# Patient Record
Sex: FEMALE | Race: BLACK OR AFRICAN AMERICAN | NOT HISPANIC OR LATINO | URBAN - METROPOLITAN AREA
[De-identification: names, ages, dates, MRNs, and addresses within clinical notes are randomized per-mention and may not be internally consistent; named-entity substitution may affect disease eponyms.]

---

## 2017-07-14 ENCOUNTER — EMERGENCY (EMERGENCY)
Facility: HOSPITAL | Age: 67
LOS: 1 days | Discharge: HOME | End: 2017-07-14
Attending: EMERGENCY MEDICINE | Admitting: EMERGENCY MEDICINE
Payer: COMMERCIAL

## 2017-07-14 VITALS
TEMPERATURE: 98 F | OXYGEN SATURATION: 97 % | DIASTOLIC BLOOD PRESSURE: 83 MMHG | HEART RATE: 60 BPM | SYSTOLIC BLOOD PRESSURE: 145 MMHG | RESPIRATION RATE: 18 BRPM

## 2017-07-14 VITALS
RESPIRATION RATE: 16 BRPM | SYSTOLIC BLOOD PRESSURE: 152 MMHG | HEART RATE: 71 BPM | OXYGEN SATURATION: 99 % | DIASTOLIC BLOOD PRESSURE: 83 MMHG | WEIGHT: 242.07 LBS | TEMPERATURE: 98 F

## 2017-07-14 DIAGNOSIS — M25.462 EFFUSION, LEFT KNEE: ICD-10-CM

## 2017-07-14 DIAGNOSIS — R53.1 WEAKNESS: ICD-10-CM

## 2017-07-14 DIAGNOSIS — Z88.6 ALLERGY STATUS TO ANALGESIC AGENT: ICD-10-CM

## 2017-07-14 DIAGNOSIS — M25.562 PAIN IN LEFT KNEE: ICD-10-CM
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
## 2017-07-14 DIAGNOSIS — Z79.899 OTHER LONG TERM (CURRENT) DRUG THERAPY: ICD-10-CM
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
## 2017-07-14 DIAGNOSIS — G89.29 OTHER CHRONIC PAIN: ICD-10-CM
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
## 2017-07-14 PROCEDURE — 99284 EMERGENCY DEPT VISIT MOD MDM: CPT | Mod: 25

## 2017-07-14 PROCEDURE — 93005 ELECTROCARDIOGRAM TRACING: CPT

## 2017-07-14 PROCEDURE — 93010 ELECTROCARDIOGRAM REPORT: CPT

## 2017-07-14 PROCEDURE — 99283 EMERGENCY DEPT VISIT LOW MDM: CPT | Mod: 25

## 2017-07-14 PROCEDURE — 73562 X-RAY EXAM OF KNEE 3: CPT | Mod: 26,LT

## 2017-07-14 PROCEDURE — 73562 X-RAY EXAM OF KNEE 3: CPT

## 2017-07-14 PROCEDURE — 96372 THER/PROPH/DIAG INJ SC/IM: CPT

## 2017-07-14 RX ORDER — KETOROLAC TROMETHAMINE 30 MG/ML
30 SYRINGE (ML) INJECTION ONCE
Qty: 0 | Refills: 0 | Status: DISCONTINUED | OUTPATIENT
Start: 2017-07-14 | End: 2017-07-14

## 2017-07-14 RX ORDER — DICLOFENAC SODIUM 75 MG/1
1 TABLET, DELAYED RELEASE ORAL
Qty: 15 | Refills: 0 | OUTPATIENT
Start: 2017-07-14 | End: 2017-07-19

## 2017-07-14 RX ADMIN — Medication 30 MILLIGRAM(S): at 21:20

## 2017-07-14 NOTE — ED ADULT NURSE NOTE - CHIEF COMPLAINT QUOTE
weakness and pain to both legs since 1 month ago ; its worsening now; legs had been giving out; cant walk good; had fallen twice this week; both my ankles are swollen

## 2017-07-14 NOTE — ED PROVIDER NOTE - ATTENDING CONTRIBUTION TO CARE
66 yof c/o knees giving out x weeks.  states fell onto her L knee 1 wk ago.  no back pain, no urinary sx, no paresthesia.  hx of arthritis.  no other trauma, no UE weakness.    agree w/ PA, baseline ambulation w/ walker, unchanged, no obvious joint swelling or deformity, will check xray, low suspicion for cord compression or infectious process or septic joint

## 2017-07-14 NOTE — ED ADULT NURSE NOTE - OBJECTIVE STATEMENT
pt aaox3, presents to ED c/o "losing strength in my legs they giving out." Pt has bilateral lower leg swelling and pain. Pain L>R. Minor Non-pitting swelling pt aaox3, presents to ED c/o "losing strength in my legs they giving out." Pt c/o bilateral lower leg swelling and pain that has worsen this week. Onset 1 month. Pain L>R. Minor Non-pitting swelling. pt states ankle swelling "has been there a while." PMH HTN, HLD, arthritis, gastric ulcer.

## 2017-07-14 NOTE — ED PROVIDER NOTE - MEDICAL DECISION MAKING DETAILS
pt w/chronic knee pain / hx of arthritis, has not gone back to her orthopedist but has had cortisone inj in past w/good relief, fell a wk ago and having L knee pain, no lbp / no signs or concern for cord compression, + symptomatic relief w/toradol, ace wrap for ambulatory comfort (not a candidate for knee immobilizer 2/2 to body habitus), given ortho f/u, pt understands and agrees w/plan

## 2017-07-14 NOTE — ED PROVIDER NOTE - MUSCULOSKELETAL, MLM
no back tend, FROM, ambulatory w/walker (baseline), no pelvis instability, no hip tend b/l, able to straight leg raise b/l, muscle strength 5/5 b/l LE, knees: min swelling over L anterior knee, no patella tend b/l, FROM b/l, no tib / fib tend b/l, pedal pulses 2+ b/l, + light touch b/l

## 2017-07-14 NOTE — ED PROVIDER NOTE - OBJECTIVE STATEMENT
The pt is a 67 y/o F, who presents to ED c/o knees giving out x few wks. Hx of arthritis, fell onto L knee 1 wk ago, takes naprosyn w/relief. Pain is 4/10, aggravated w/walking and moving, hx of arthritis - has gotten cortisone shots in past, went to see a pmd and advised to go to ED instead of setting up outpatient f/u to expedite care. Denies h/a, back pain, loss of bowel or bladder control, abd pain, cp, sob, fevers, chills

## 2017-07-14 NOTE — ED ADULT TRIAGE NOTE - CHIEF COMPLAINT QUOTE
weakness and pain to both legs since 1 month ago ; its worsening now; legs had been giving out; cant walk good; had fallen twice this week weakness and pain to both legs since 1 month ago ; its worsening now; legs had been giving out; cant walk good; had fallen twice this week; both my ankles are swollen

## 2017-07-21 ENCOUNTER — APPOINTMENT (OUTPATIENT)
Dept: ORTHOPEDIC SURGERY | Facility: CLINIC | Age: 67
End: 2017-07-21

## 2017-07-21 DIAGNOSIS — M17.12 UNILATERAL PRIMARY OSTEOARTHRITIS, LEFT KNEE: ICD-10-CM

## 2017-07-26 ENCOUNTER — EMERGENCY (EMERGENCY)
Facility: HOSPITAL | Age: 67
LOS: 1 days | Discharge: HOME | End: 2017-07-26
Attending: EMERGENCY MEDICINE | Admitting: EMERGENCY MEDICINE
Payer: COMMERCIAL

## 2017-07-26 VITALS
RESPIRATION RATE: 18 BRPM | SYSTOLIC BLOOD PRESSURE: 134 MMHG | TEMPERATURE: 98 F | OXYGEN SATURATION: 96 % | HEART RATE: 66 BPM | DIASTOLIC BLOOD PRESSURE: 87 MMHG

## 2017-07-26 VITALS
TEMPERATURE: 99 F | HEIGHT: 66 IN | WEIGHT: 250 LBS | RESPIRATION RATE: 18 BRPM | DIASTOLIC BLOOD PRESSURE: 86 MMHG | SYSTOLIC BLOOD PRESSURE: 146 MMHG | HEART RATE: 67 BPM | OXYGEN SATURATION: 97 %

## 2017-07-26 DIAGNOSIS — Z98.890 OTHER SPECIFIED POSTPROCEDURAL STATES: Chronic | ICD-10-CM

## 2017-07-26 DIAGNOSIS — E78.5 HYPERLIPIDEMIA, UNSPECIFIED: ICD-10-CM

## 2017-07-26 DIAGNOSIS — M17.12 UNILATERAL PRIMARY OSTEOARTHRITIS, LEFT KNEE: ICD-10-CM

## 2017-07-26 DIAGNOSIS — I10 ESSENTIAL (PRIMARY) HYPERTENSION: ICD-10-CM

## 2017-07-26 DIAGNOSIS — Z87.891 PERSONAL HISTORY OF NICOTINE DEPENDENCE: ICD-10-CM

## 2017-07-26 DIAGNOSIS — Z88.6 ALLERGY STATUS TO ANALGESIC AGENT: ICD-10-CM

## 2017-07-26 DIAGNOSIS — Z79.899 OTHER LONG TERM (CURRENT) DRUG THERAPY: ICD-10-CM

## 2017-07-26 DIAGNOSIS — R53.1 WEAKNESS: ICD-10-CM

## 2017-07-26 LAB
ALBUMIN SERPL ELPH-MCNC: 4 G/DL — SIGNIFICANT CHANGE UP (ref 3.3–5)
ALP SERPL-CCNC: 68 U/L — SIGNIFICANT CHANGE UP (ref 40–120)
ALT FLD-CCNC: 22 U/L — SIGNIFICANT CHANGE UP (ref 10–45)
ANION GAP SERPL CALC-SCNC: 13 MMOL/L — SIGNIFICANT CHANGE UP (ref 5–17)
AST SERPL-CCNC: 19 U/L — SIGNIFICANT CHANGE UP (ref 10–40)
BILIRUB SERPL-MCNC: 0.4 MG/DL — SIGNIFICANT CHANGE UP (ref 0.2–1.2)
BUN SERPL-MCNC: 12 MG/DL — SIGNIFICANT CHANGE UP (ref 7–23)
CALCIUM SERPL-MCNC: 9.3 MG/DL — SIGNIFICANT CHANGE UP (ref 8.4–10.5)
CHLORIDE SERPL-SCNC: 104 MMOL/L — SIGNIFICANT CHANGE UP (ref 96–108)
CO2 SERPL-SCNC: 24 MMOL/L — SIGNIFICANT CHANGE UP (ref 22–31)
CREAT SERPL-MCNC: 0.7 MG/DL — SIGNIFICANT CHANGE UP (ref 0.5–1.3)
GLUCOSE SERPL-MCNC: 88 MG/DL — SIGNIFICANT CHANGE UP (ref 70–99)
HCT VFR BLD CALC: 39.6 % — SIGNIFICANT CHANGE UP (ref 34.5–45)
HGB BLD-MCNC: 13 G/DL — SIGNIFICANT CHANGE UP (ref 11.5–15.5)
MCHC RBC-ENTMCNC: 30.6 PG — SIGNIFICANT CHANGE UP (ref 27–34)
MCHC RBC-ENTMCNC: 32.8 G/DL — SIGNIFICANT CHANGE UP (ref 32–36)
MCV RBC AUTO: 93.2 FL — SIGNIFICANT CHANGE UP (ref 80–100)
PLATELET # BLD AUTO: 263 K/UL — SIGNIFICANT CHANGE UP (ref 150–400)
POTASSIUM SERPL-MCNC: 4.1 MMOL/L — SIGNIFICANT CHANGE UP (ref 3.5–5.3)
POTASSIUM SERPL-SCNC: 4.1 MMOL/L — SIGNIFICANT CHANGE UP (ref 3.5–5.3)
PROT SERPL-MCNC: 7.5 G/DL — SIGNIFICANT CHANGE UP (ref 6–8.3)
RBC # BLD: 4.25 M/UL — SIGNIFICANT CHANGE UP (ref 3.8–5.2)
RBC # FLD: 13.7 % — SIGNIFICANT CHANGE UP (ref 10.3–16.9)
SODIUM SERPL-SCNC: 141 MMOL/L — SIGNIFICANT CHANGE UP (ref 135–145)
TSH SERPL-MCNC: 1.02 UIU/ML — SIGNIFICANT CHANGE UP (ref 0.35–4.94)
WBC # BLD: 10.7 K/UL — HIGH (ref 3.8–10.5)
WBC # FLD AUTO: 10.7 K/UL — HIGH (ref 3.8–10.5)

## 2017-07-26 PROCEDURE — 99284 EMERGENCY DEPT VISIT MOD MDM: CPT

## 2017-07-26 RX ORDER — OXYCODONE AND ACETAMINOPHEN 5; 325 MG/1; MG/1
2 TABLET ORAL ONCE
Qty: 0 | Refills: 0 | Status: DISCONTINUED | OUTPATIENT
Start: 2017-07-26 | End: 2017-07-26

## 2017-07-26 NOTE — ED ADULT NURSE NOTE - OBJECTIVE STATEMENT
pt. presented with c/o inability to walk getting worse over 3 weeks, pt. states her "legs give out" and c/o left knee pain and limited ROM. Pt. A&Ox3, communicates w/o difficulty, denies chest pain, shortness of breath, n/v/d, fever, chills, no neuro deficits noted.

## 2017-07-26 NOTE — ED PROVIDER NOTE - CONSTITUTIONAL, MLM
normal... Tearful during physical exam. Well appearing, well nourished, awake, alert, oriented to person, place, time/situation and in no apparent distress.

## 2017-07-26 NOTE — ED ADULT TRIAGE NOTE - CHIEF COMPLAINT QUOTE
Pt previously seen by [facility] ED and referred to ORTHO presents with WEAKNESS, INABILITY TO WEIGHT BEAR X 3 WEEKS

## 2017-07-26 NOTE — ED ADULT NURSE NOTE - CHIEF COMPLAINT QUOTE
Pt previously seen by [facility] ED and referred to ORTHO presents with WEAKNESS, INABILITY TO WEIGHT BEAR X 3 WEEKS

## 2017-07-26 NOTE — ED PROVIDER NOTE - MEDICAL DECISION MAKING DETAILS
67 yo F with Hx of Hypertension, hyperlipidemia, and L knee osteoarthritis who presented with weakness. Hgb, electrolytes and TSH WNL. Patient previously walking with walker, but now using wheelchair 2/2 L knee osteoarthritis. Scheduled for orthopedics appointment on 8/25 for likely L knee arthroplasty. 65 yo F with Hx of Hypertension, hyperlipidemia, and L knee osteoarthritis who presented with weakness 2/2 knee pain. Hgb, electrolytes and TSH WNL. No neurologic weakness, ataxia, numbness. Reflexes intact, no Babinski. Patient previously walking with walker, but obtained a wheelchair on her own which she is now using 2/2 L knee osteoarthritis. Seen by orthopedics last week and diagnosed with severe L knee osteoarthritis likely requiring surgery. Scheduled for f/u orthopedics appointment on 8/25. 65 yo F with Hx of Hypertension, hyperlipidemia, and L knee osteoarthritis who presented with weakness 2/2 knee pain. Hgb, electrolytes and TSH WNL. No neurologic weakness, ataxia, numbness. Reflexes intact, no Babinski. Patient previously walking with walker, but obtained a wheelchair on her own which she is now using 2/2 L knee osteoarthritis. Seen by orthopedics last week and diagnosed with severe L knee osteoarthritis likely requiring surgery. Percocet for pain control. Resume NSAIDs when home. Scheduled for f/u orthopedics appointment on 8/25. Patient was medically optimized, stable and ready for discharge. Plan of care and return precautions were discussed with the patient who verbally stated understanding. Ambulance for transport home.

## 2017-07-26 NOTE — ED PROVIDER NOTE - GASTROINTESTINAL NEGATIVE STATEMENT, MLM
+constipation, no abdominal pain, no bloating, no diarrhea, no nausea and no vomiting. Good appetite.

## 2017-07-26 NOTE — ED PROVIDER NOTE - OBJECTIVE STATEMENT
65 yo F with Hx of Hypertension, hyperlipidemia, and L knee osteoarthritis presents with difficulty standing 2/2 weakness. Patient reports that she has had frequent falls 2/2 generalized weakness. She presented to the ED 11 days ago for L knee pain following fall. At that time xray showed now evidence of acute traumatic knee injury. The knee was wrapped and she was discharged with orthopedic follow up. She was seen by ortho, who diagnosed severe osteoarthritis and recommended knee replacement for which she was referred to Dr Miller with whom she has an appointment on 8/25. She presented today for continued, generalized weakness worse in the LLE. She was previously able to walk with a walker, but states that she has been staying in a wheelchair because of L knee pain. Today she was unable to stand from the wheelchair. She slid to the floor and required assistance to get back into the wheelchair. She also complains of numbness and tingling "all over," but on further questioning does not have decreased sensation. She denies any other systemic symptoms. 65 yo F with Hx of Hypertension, hyperlipidemia, and L knee osteoarthritis presents with difficulty standing 2/2 weakness. Patient reports that she has had frequent falls 2/2 generalized weakness. She presented to the ED 11 days ago for L knee pain following fall. At that time xray showed now evidence of acute traumatic knee injury. The knee was wrapped and she was discharged with orthopedic follow up. She was seen by ortho, who diagnosed severe osteoarthritis and recommended knee replacement for which she was referred to Dr Miller with whom she has an appointment on 8/25. She presented today for continued, generalized weakness worse in the LLE. She was previously able to walk with a walker, but states that she has been staying in a wheelchair, which she recently obtained because of L knee pain. Knee pain has been controlled with diclofenac, which she had refilled by an outpatient provider. Today she was unable to stand from the wheelchair. She slid to the floor and required assistance to get back into the wheelchair. She also complains of numbness and tingling "all over," but on further questioning does not have decreased sensation. She denies any other systemic symptoms.

## 2017-07-26 NOTE — ED PROVIDER NOTE - ATTENDING CONTRIBUTION TO CARE
Agree with resident's documentation -- pt's OA pain improved with NSAIDS, no evidence on exam or history compatible with infection/septic knees or gouty arthritis, will f/u with PMD to arrange home-care as pt feels she would benefit from a home aid while she prepares for knee surgery with her orthopedic surgeon, with whom pt is already connected. given strict return precautions and anticipatory guidance.

## 2017-07-27 LAB — VIT B12 SERPL-MCNC: 287 PG/ML — SIGNIFICANT CHANGE UP (ref 243–894)

## 2017-07-27 PROCEDURE — 82607 VITAMIN B-12: CPT

## 2017-07-27 PROCEDURE — 84443 ASSAY THYROID STIM HORMONE: CPT

## 2017-07-27 PROCEDURE — 36415 COLL VENOUS BLD VENIPUNCTURE: CPT

## 2017-07-27 PROCEDURE — 80053 COMPREHEN METABOLIC PANEL: CPT

## 2017-07-27 PROCEDURE — 99283 EMERGENCY DEPT VISIT LOW MDM: CPT

## 2017-07-27 PROCEDURE — 85027 COMPLETE CBC AUTOMATED: CPT

## 2017-07-27 RX ADMIN — OXYCODONE AND ACETAMINOPHEN 2 TABLET(S): 5; 325 TABLET ORAL at 00:00

## 2017-08-13 PROBLEM — M17.12 PRIMARY OSTEOARTHRITIS OF LEFT KNEE: Status: ACTIVE | Noted: 2017-08-13

## 2017-08-16 ENCOUNTER — INPATIENT (INPATIENT)
Facility: HOSPITAL | Age: 67
LOS: 5 days | Discharge: HOME | DRG: 471 | End: 2017-08-22
Attending: ORTHOPAEDIC SURGERY | Admitting: ORTHOPAEDIC SURGERY
Payer: SELF-PAY

## 2017-08-16 VITALS
OXYGEN SATURATION: 99 % | RESPIRATION RATE: 16 BRPM | TEMPERATURE: 98 F | DIASTOLIC BLOOD PRESSURE: 86 MMHG | HEART RATE: 54 BPM | SYSTOLIC BLOOD PRESSURE: 176 MMHG

## 2017-08-16 DIAGNOSIS — M48.02 SPINAL STENOSIS, CERVICAL REGION: ICD-10-CM
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
## 2017-08-16 DIAGNOSIS — Z29.9 ENCOUNTER FOR PROPHYLACTIC MEASURES, UNSPECIFIED: ICD-10-CM

## 2017-08-16 DIAGNOSIS — Z98.890 OTHER SPECIFIED POSTPROCEDURAL STATES: Chronic | ICD-10-CM

## 2017-08-16 DIAGNOSIS — R29.898 OTHER SYMPTOMS AND SIGNS INVOLVING THE MUSCULOSKELETAL SYSTEM: ICD-10-CM

## 2017-08-16 DIAGNOSIS — R63.8 OTHER SYMPTOMS AND SIGNS CONCERNING FOOD AND FLUID INTAKE: ICD-10-CM

## 2017-08-16 DIAGNOSIS — E78.5 HYPERLIPIDEMIA, UNSPECIFIED: ICD-10-CM

## 2017-08-16 DIAGNOSIS — S14.125A CENTRAL CORD SYNDROME AT C5 LEVEL OF CERVICAL SPINAL CORD, INITIAL ENCOUNTER: ICD-10-CM

## 2017-08-16 DIAGNOSIS — R00.1 BRADYCARDIA, UNSPECIFIED: ICD-10-CM

## 2017-08-16 DIAGNOSIS — I10 ESSENTIAL (PRIMARY) HYPERTENSION: ICD-10-CM

## 2017-08-16 DIAGNOSIS — M50.01 CERVICAL DISC DISORDER WITH MYELOPATHY, HIGH CERVICAL REGION: ICD-10-CM

## 2017-08-16 DIAGNOSIS — M17.10 UNILATERAL PRIMARY OSTEOARTHRITIS, UNSPECIFIED KNEE: ICD-10-CM

## 2017-08-16 LAB
ALBUMIN SERPL ELPH-MCNC: 3.9 G/DL — SIGNIFICANT CHANGE UP (ref 3.3–5)
ALP SERPL-CCNC: 64 U/L — SIGNIFICANT CHANGE UP (ref 40–120)
ALT FLD-CCNC: 14 U/L — SIGNIFICANT CHANGE UP (ref 10–45)
ANION GAP SERPL CALC-SCNC: 12 MMOL/L — SIGNIFICANT CHANGE UP (ref 5–17)
AST SERPL-CCNC: 12 U/L — SIGNIFICANT CHANGE UP (ref 10–40)
BASOPHILS NFR BLD AUTO: 0.6 % — SIGNIFICANT CHANGE UP (ref 0–2)
BILIRUB SERPL-MCNC: 0.4 MG/DL — SIGNIFICANT CHANGE UP (ref 0.2–1.2)
BUN SERPL-MCNC: 9 MG/DL — SIGNIFICANT CHANGE UP (ref 7–23)
CALCIUM SERPL-MCNC: 9.3 MG/DL — SIGNIFICANT CHANGE UP (ref 8.4–10.5)
CHLORIDE SERPL-SCNC: 103 MMOL/L — SIGNIFICANT CHANGE UP (ref 96–108)
CO2 SERPL-SCNC: 27 MMOL/L — SIGNIFICANT CHANGE UP (ref 22–31)
CREAT SERPL-MCNC: 0.8 MG/DL — SIGNIFICANT CHANGE UP (ref 0.5–1.3)
EOSINOPHIL NFR BLD AUTO: 1.4 % — SIGNIFICANT CHANGE UP (ref 0–6)
ERYTHROCYTE [SEDIMENTATION RATE] IN BLOOD: 43 MM/HR — HIGH
GLUCOSE SERPL-MCNC: 119 MG/DL — HIGH (ref 70–99)
HCT VFR BLD CALC: 34.3 % — LOW (ref 34.5–45)
HGB BLD-MCNC: 11.8 G/DL — SIGNIFICANT CHANGE UP (ref 11.5–15.5)
LYMPHOCYTES # BLD AUTO: 29.6 % — SIGNIFICANT CHANGE UP (ref 13–44)
MCHC RBC-ENTMCNC: 31.2 PG — SIGNIFICANT CHANGE UP (ref 27–34)
MCHC RBC-ENTMCNC: 34.4 G/DL — SIGNIFICANT CHANGE UP (ref 32–36)
MCV RBC AUTO: 90.7 FL — SIGNIFICANT CHANGE UP (ref 80–100)
MONOCYTES NFR BLD AUTO: 7.2 % — SIGNIFICANT CHANGE UP (ref 2–14)
NEUTROPHILS NFR BLD AUTO: 61.2 % — SIGNIFICANT CHANGE UP (ref 43–77)
PLATELET # BLD AUTO: 277 K/UL — SIGNIFICANT CHANGE UP (ref 150–400)
POTASSIUM SERPL-MCNC: 4.3 MMOL/L — SIGNIFICANT CHANGE UP (ref 3.5–5.3)
POTASSIUM SERPL-SCNC: 4.3 MMOL/L — SIGNIFICANT CHANGE UP (ref 3.5–5.3)
PROT SERPL-MCNC: 7.6 G/DL — SIGNIFICANT CHANGE UP (ref 6–8.3)
RBC # BLD: 3.78 M/UL — LOW (ref 3.8–5.2)
RBC # BLD: 3.78 M/UL — LOW (ref 3.8–5.2)
RBC # FLD: 13.3 % — SIGNIFICANT CHANGE UP (ref 10.3–16.9)
RETICS/RBC NFR: 1.3 % — SIGNIFICANT CHANGE UP (ref 0.5–2.5)
SODIUM SERPL-SCNC: 142 MMOL/L — SIGNIFICANT CHANGE UP (ref 135–145)
WBC # BLD: 10.5 K/UL — SIGNIFICANT CHANGE UP (ref 3.8–10.5)
WBC # FLD AUTO: 10.5 K/UL — SIGNIFICANT CHANGE UP (ref 3.8–10.5)

## 2017-08-16 PROCEDURE — 71020: CPT | Mod: 26

## 2017-08-16 PROCEDURE — 93010 ELECTROCARDIOGRAM REPORT: CPT

## 2017-08-16 PROCEDURE — 72156 MRI NECK SPINE W/O & W/DYE: CPT | Mod: 26

## 2017-08-16 PROCEDURE — 72100 X-RAY EXAM L-S SPINE 2/3 VWS: CPT | Mod: 26

## 2017-08-16 PROCEDURE — 99223 1ST HOSP IP/OBS HIGH 75: CPT | Mod: GC

## 2017-08-16 PROCEDURE — 72142 MRI NECK SPINE W/DYE: CPT | Mod: 26

## 2017-08-16 PROCEDURE — 99285 EMERGENCY DEPT VISIT HI MDM: CPT | Mod: 25

## 2017-08-16 RX ORDER — DEXAMETHASONE 0.5 MG/5ML
10 ELIXIR ORAL ONCE
Qty: 0 | Refills: 0 | Status: DISCONTINUED | OUTPATIENT
Start: 2017-08-16 | End: 2017-08-16

## 2017-08-16 RX ORDER — INSULIN LISPRO 100/ML
VIAL (ML) SUBCUTANEOUS
Qty: 0 | Refills: 0 | Status: DISCONTINUED | OUTPATIENT
Start: 2017-08-16 | End: 2017-08-22

## 2017-08-16 RX ORDER — ATORVASTATIN CALCIUM 80 MG/1
20 TABLET, FILM COATED ORAL AT BEDTIME
Qty: 0 | Refills: 0 | Status: DISCONTINUED | OUTPATIENT
Start: 2017-08-16 | End: 2017-08-22

## 2017-08-16 RX ORDER — DEXTROSE 50 % IN WATER 50 %
25 SYRINGE (ML) INTRAVENOUS ONCE
Qty: 0 | Refills: 0 | Status: DISCONTINUED | OUTPATIENT
Start: 2017-08-16 | End: 2017-08-22

## 2017-08-16 RX ORDER — ACETAMINOPHEN 500 MG
650 TABLET ORAL EVERY 6 HOURS
Qty: 0 | Refills: 0 | Status: DISCONTINUED | OUTPATIENT
Start: 2017-08-16 | End: 2017-08-17

## 2017-08-16 RX ORDER — AMLODIPINE BESYLATE 2.5 MG/1
5 TABLET ORAL DAILY
Qty: 0 | Refills: 0 | Status: DISCONTINUED | OUTPATIENT
Start: 2017-08-16 | End: 2017-08-16

## 2017-08-16 RX ORDER — GLUCAGON INJECTION, SOLUTION 0.5 MG/.1ML
1 INJECTION, SOLUTION SUBCUTANEOUS ONCE
Qty: 0 | Refills: 0 | Status: DISCONTINUED | OUTPATIENT
Start: 2017-08-16 | End: 2017-08-22

## 2017-08-16 RX ORDER — DEXTROSE 50 % IN WATER 50 %
1 SYRINGE (ML) INTRAVENOUS ONCE
Qty: 0 | Refills: 0 | Status: DISCONTINUED | OUTPATIENT
Start: 2017-08-16 | End: 2017-08-22

## 2017-08-16 RX ORDER — DEXAMETHASONE 0.5 MG/5ML
6 ELIXIR ORAL EVERY 6 HOURS
Qty: 0 | Refills: 0 | Status: DISCONTINUED | OUTPATIENT
Start: 2017-08-17 | End: 2017-08-22

## 2017-08-16 RX ORDER — DEXTROSE 50 % IN WATER 50 %
12.5 SYRINGE (ML) INTRAVENOUS ONCE
Qty: 0 | Refills: 0 | Status: DISCONTINUED | OUTPATIENT
Start: 2017-08-16 | End: 2017-08-22

## 2017-08-16 RX ORDER — AMLODIPINE BESYLATE 2.5 MG/1
10 TABLET ORAL DAILY
Qty: 0 | Refills: 0 | Status: DISCONTINUED | OUTPATIENT
Start: 2017-08-16 | End: 2017-08-22

## 2017-08-16 RX ORDER — HEPARIN SODIUM 5000 [USP'U]/ML
5000 INJECTION INTRAVENOUS; SUBCUTANEOUS EVERY 8 HOURS
Qty: 0 | Refills: 0 | Status: DISCONTINUED | OUTPATIENT
Start: 2017-08-16 | End: 2017-08-17

## 2017-08-16 RX ORDER — SODIUM CHLORIDE 9 MG/ML
1000 INJECTION, SOLUTION INTRAVENOUS
Qty: 0 | Refills: 0 | Status: DISCONTINUED | OUTPATIENT
Start: 2017-08-16 | End: 2017-08-22

## 2017-08-16 RX ORDER — ATORVASTATIN CALCIUM 80 MG/1
0 TABLET, FILM COATED ORAL
Qty: 0 | Refills: 0 | COMMUNITY

## 2017-08-16 RX ORDER — DEXAMETHASONE 0.5 MG/5ML
10 ELIXIR ORAL ONCE
Qty: 0 | Refills: 0 | Status: COMPLETED | OUTPATIENT
Start: 2017-08-16 | End: 2017-08-16

## 2017-08-16 RX ORDER — ATENOLOL 25 MG/1
0 TABLET ORAL
Qty: 0 | Refills: 0 | COMMUNITY

## 2017-08-16 RX ADMIN — HEPARIN SODIUM 5000 UNIT(S): 5000 INJECTION INTRAVENOUS; SUBCUTANEOUS at 23:00

## 2017-08-16 RX ADMIN — Medication 10 MILLIGRAM(S): at 23:05

## 2017-08-16 RX ADMIN — ATORVASTATIN CALCIUM 20 MILLIGRAM(S): 80 TABLET, FILM COATED ORAL at 21:17

## 2017-08-16 NOTE — H&P ADULT - PROBLEM SELECTOR PLAN 3
In ED /86. Home regimen: atenolol 25mg daily, amlodipine 10mg daily, valsartan 320mg daily, furosemide 20mg daily;  patient hasn't filled her medications for the past year  - started on amlodipine 10mg PO daily In ED /86. Medications recorded at pharmacy (haven't been picked up in over a year): atenolol 25mg daily, amlodipine 10mg daily, valsartan 320mg daily, furosemide 20mg daily; only reported to have picked up diclofenac at pharmacy (new script is 25mg)    - started on amlodipine 10mg PO daily

## 2017-08-16 NOTE — H&P ADULT - NSHPSOCIALHISTORY_GEN_ALL_CORE
Pt's daughter lives with patient and helps her with ADLs (bathing, cooking).   Pt used to work for [employer] but hasn't been working for the past month.  Never smoker. Never IVDU. Occasional alcohol intake. Pt's daughter lives with patient and helps her with ADLs (bathing, cooking).   Pt used to work for [employer] but hasn't been working for the past month.  Never smoker. Never IVDU. Occasional alcohol intake.  Uses walker to ambulate, occasionally wheelchair

## 2017-08-16 NOTE — H&P ADULT - ASSESSMENT
66F with PMH of HTN, HLD, gastric ulcer, L knee osteoarthritis was sent to [facility] ED by neurologist for weakness and inability to ambulate. Admitted for PT eval and work-up for cervical spine compression vs. cervical myelitis vs. cervical spondylosis. 66F with PMH of HTN, HLD, gastric ulcer, L knee osteoarthritis was sent to [facility] ED by neurologist for UE weakness and c/o L knee weakness and inability to ambulate. Admitted for PT eval and work-up for cervical spine compression vs. cervical myelitis vs. cervical spondylosis.

## 2017-08-16 NOTE — H&P ADULT - ATTENDING COMMENTS
Pt seen and examined at bedside     Agree with HPI, ROS as above.     VS, Labs, FH, SH, allergies, medications, imaging reviewed. Agree with physical exam as above.    A/P: 66F with PMH of HTN, HLD, gastric ulcer, L knee osteoarthritis was sent to [facility] ED by neurologist for UE weakness and c/o L knee weakness and inability to ambulate. Admitted for PT eval and work-up for cervical spine compression vs. cervical myelitis vs. cervical spondylosis.     **Weakness  -Likely mechanical 2/2 long-standing hx of L knee osteoarthritis; this is the patients 3rd ED visit for the same symptoms of knee pain; has scheduled appointment with orthopedist on 8/29/2017, has previously received steroid injections at [facility], currently asymptomatic  -c/w Tylenol 650mg q6h PRN moderate pain   -PT consult in AM  -Ortho consulted appreciate further recs  -Pt's outpatient neurologist had also noted weakness of upper extremities, exam today showing full strength and no neurologic abnormalities  -Cervical MRI ordered in ED    Plan otherwise as outlined above..... Pt seen and examined at bedside     Agree with HPI, ROS as above.     VS, Labs, FH, SH, allergies, medications, imaging reviewed. Agree with physical exam as above.    A/P: 66F with PMH of HTN, HLD, gastric ulcer, L knee osteoarthritis was sent to [facility] ED by neurologist for UE weakness and c/o L knee weakness and inability to ambulate. Admitted for PT eval and work-up for cervical spine compression vs. cervical myelitis vs. cervical spondylosis.     **Weakness  -Likely mechanical 2/2 long-standing hx of L knee osteoarthritis; this is the patients 3rd ED visit for the same symptoms of knee pain; has scheduled appointment with orthopedist on 8/29/2017, has previously received steroid injections at [facility], currently asymptomatic  -c/w Tylenol 650mg q6h PRN moderate pain   -PT consult in AM  -Ortho consulted appreciate further recs  -Pt's outpatient neurologist had also noted weakness of upper extremities  -Cervical MRI ordered in ED    Plan otherwise as outlined above..... Pt seen and examined at bedside on 8/16 @ 2100    Pt reports that she has had progressive weakness of her LLE over the last ~ 4 weeks. Reports that she was previously working as a  and on her feet all day but noticed that her left leg was getting weak. Is no longer able to work and now needs a walker to ambulate. Was seen several times in our ED as well as outpatient, previous weakness attributed to L knee OA. Was seen by a neurologist today who found that patient had bilateral upper extremity weakness and was concerned for cord compression and told patient to come to the ED. Patient currently denies fevers, chills, SOB, CP, N/V/D, abdominal pain. Does report urinary incontinence but only because she is unable to make it to the bathroom on time. Denies fecal incontinence. Denies sensory loss. Denies rash    VS, Labs, FH, SH, allergies, medications, imaging reviewed. Agree with physical exam as above with the following modification  Neuro exam showing motor strength 4/5 motor strength in her RLE, 3+/5 in LLE    A/P: 66F with PMH of HTN, HLD, gastric ulcer, L knee osteoarthritis was sent to [facility] ED by neurologist for UE weakness and c/o L knee weakness and inability to ambulate. Admitted for PT eval and work-up for cervical spine compression vs. cervical myelitis vs. cervical spondylosis.     **Weakness  -Unclear etiology - part of weakness may be mechanical 2/2 long-standing hx of L knee osteoarthritis, but given leg weakness concern for possible neurologic involvement?  -c/w Tylenol 650mg q6h PRN moderate pain   -PT consult in AM  -Ortho consulted appreciate further recs  -Pt's outpatient neurologist had also noted weakness of upper extremities  -Cervical MRI ordered in ED, given weakness will also check lumbar MRI    Plan otherwise as outlined above.....    Addendum  Cervical MRI read as cord compression of c3/4. Neurosurgery was consulted, no acute surgical intervention but will Pt seen and examined at bedside on 8/16 @ 2030    Pt reports that she has had progressive weakness of her LLE over the last ~ 4 weeks. Reports that she was previously working as a  and on her feet all day but noticed that her left leg was getting weak. Is no longer able to work and now needs a walker to ambulate. Was seen several times in our ED as well as outpatient, previous weakness attributed to L knee OA. Was seen by a neurologist today who found that patient had bilateral upper extremity weakness and was concerned for cord compression and told patient to come to the ED. Patient currently denies fevers, chills, SOB, CP, N/V/D, abdominal pain. Does report urinary incontinence but only because she is unable to make it to the bathroom on time. Denies fecal incontinence. Denies sensory loss. Denies rash    VS, Labs, FH, SH, allergies, medications, imaging reviewed. Agree with physical exam as above with the following modification  Neuro exam showing motor strength 4/5 motor strength in her RLE, 3+/5 in LLE    A/P: 66F with PMH of HTN, HLD, gastric ulcer, L knee osteoarthritis was sent to [facility] ED by neurologist for UE weakness and c/o L knee weakness and inability to ambulate. Admitted for PT eval and work-up for cervical spine compression vs. cervical myelitis vs. cervical spondylosis.     **Weakness  -Unclear etiology - part of weakness may be mechanical 2/2 long-standing hx of L knee osteoarthritis, but given leg weakness concern for possible neurologic involvement?  -c/w Tylenol 650mg q6h PRN moderate pain   -PT consult in AM  -Ortho consulted appreciate further recs  -Pt's outpatient neurologist had also noted weakness of upper extremities  -Cervical MRI ordered in ED, given weakness will also check lumbar MRI    Plan otherwise as outlined above.....    Addendum  Cervical MRI read as cord compression of c3/4. Neurosurgery was consulted, no acute surgical intervention but do recommend continuing Decadron and agree with lumbar imaging. Will follow

## 2017-08-16 NOTE — ED PROVIDER NOTE - MUSCULOSKELETAL, MLM
no spinal tend, no rash, FROM, no CVA tend b/l, able to sit self up w/o assistance - grabbing rails w/b/l arm w/good strength b/l; + light touch x 4, FROM x 4 when supine, muscle strength 5/5 b/l x 4; pt w/limited mobility - ? pain related

## 2017-08-16 NOTE — ED PROVIDER NOTE - ATTENDING CONTRIBUTION TO CARE
66F with above PMHx who was sent in from her neurologist Dr. Covarrubias (who was seeing her for the first time today) for concern for c-spine compression. pt has been complaining of UE and LE weakness x 1 month, getting progressively worse. Pt was previously able to work and walk but is now having difficulty walking due to weakness. Per Dr. Covarrubias pt with decreased strength UE and LE. on exam in the ER, pt noted to have LLE weakness. A MRI of the C-spine was ordered and ortho/spine Dr. Cheung was consulted to see the patient. The patient was admitted to medicine for difficulty walking pending MRI. I followed up the MRi result and pt was found to have moderate to severe cord compression at C3/C4. Dr. Covarrubias, hospitalist, Dr. Thornton, and ortho spine dr. Cheung were informed of results. admitting team to order 10mg decadron per Dr. Covarrubias's request and to f/u recommendations from ortho spine. Per ortho Given duration of sx x 1 month no emergent surgery indicated at this time, however they will evaluate the patient and relay their recommendations to the admitting team.

## 2017-08-16 NOTE — ED ADULT NURSE NOTE - OBJECTIVE STATEMENT
67 y/o female c/o bilateral ankle swelling for a month, difficulty bearing weight d/t swelling, still able to ambulate with discomfort using walker. denies any chest pain, hx of CHF, back pain, n/v/d, sob, fever/chills. pt also reports "knot in neck, it is painful and moves around." pt reports being sent by PCP for admission.

## 2017-08-16 NOTE — ED PROVIDER NOTE - MEDICAL DECISION MAKING DETAILS
visit # 3 for similar c/o although on prior visits the cc was knee pain and has been told that has arthritis and needs knee replacement, went to f/u w/neuro today and sent to ed for admission, pt c/o inability to ambulate because unable to "support myself" - gives dif hx to dif providers, no signs of cord compression or cauda equina - pt neurovascular intact and is able to stand but dif walking ? pain vs actual weakness, ls spine xrays neg, case discussed w/ortho since pt known to them (has ortho for knee pain), will admit to hosp for PT eval, ? rehab, do not feel that any emergent MRIs indicated from the ED but for inpatient team to decide if imaging is warranted

## 2017-08-16 NOTE — H&P ADULT - NSHPPHYSICALEXAM_GEN_ALL_CORE
PHYSICAL EXAM:   · CONSTITUTIONAL: obese woman lying comfortably in bed, NAD  · HEENT: Airway patent, Nasal mucosa clear. MMM, conjunctiva clear bilaterally, pupils equal, round and reactive to light.  · CARDIAC: Normal rate, regular rhythm.+S1, S2. No murmurs, rubs or gallops.  · RESPIRATORY: Breath sounds clear and equal bilaterally. No wheezes or crackles.   · GASTROINTESTINAL: Abdomen soft, non-tender, non-distended, no guarding.  · MUSCULOSKELETAL: no spinal tenderness, no TTP of knee joint b/l, no knee effusion, warmth, or swelling, no rash, FROM, requires assistance to sit up, FROM x 4 when supine, muscle strength 5/5 b/l x 4; pt w/limited mobility   · NEUROLOGICAL: AAOx3, CN II-XII grossly intact, no focal deficits, no motor or sensory deficits.  · SKIN: warm, dry and intact.  · EXTREMITIES: +1 LE edema b/l, no warmth, erythema, or TTP

## 2017-08-16 NOTE — H&P ADULT - PROBLEM SELECTOR PLAN 4
In ED HR 54, pt asymptomatic.   - On home atenolol 25mg daily but patient hasn't filled her medications for the past year

## 2017-08-16 NOTE — H&P ADULT - HISTORY OF PRESENT ILLNESS
67yo F with PMH of HTN, HLD, gastric ulcer, L knee osteoarthritis presented to [facility] ED with UE weakness and inability to ambulate. Patient was told by her neurologist Dr. Covarrubias to come to the ED due to UE weakness in his office today. Pt c/o inability to ambulate because unable to "support myself". Patient reports chronic L knee pain. This is the 3rd ED visit for similar type of cc, although usually complains of knee pain > weakness. Was previously diagnosed with osteoarthritis of the R knee and needs L knee replacement. Has an appointment with the orthopedist on 8/29/2017. Pt has been getting steroid injections in her L knee. Started 3 weeks ago and gets injection 1/week (last one today 8/16). Also uses diclofenac (2-3 pills/day) and Tylenol (1 pill/day) PRN for joint pain. Pt reports pain in her neck but denies trauma, falls, numbness or tingling to toes or fingers, HA, chest pain, SOB, n/v/d, fevers, chills, urinary/bowel incontinence. Denies signs and symptoms of cord compression or cauda equina.    In ED:   Vital Signs  T(C): 37.1 (16 Aug 2017 18:52), Max: 37.1 (16 Aug 2017 18:52)  T(F): 98.7 (16 Aug 2017 18:52), Max: 98.7 (16 Aug 2017 18:52)  HR: 54 (16 Aug 2017 18:52) (54 - 54)  BP: 142/84 (16 Aug 2017 18:52) (142/84 - 176/86)  RR: 16 (16 Aug 2017 18:52) (16 - 16)  SpO2: 98% (16 Aug 2017 18:52) (98% - 99%)  C-spine MRI ordered.   Admitted to [facility] regional medical floor for PT eval, rehab. 67yo F with PMH of HTN, HLD, gastric ulcer, L knee osteoarthritis presented to [facility] ED with L knee pain and inability to ambulate. Patient was told by her neurologist Dr. Covarrubias to come to the ED due to UE weakness and neck cramping in his office today. Pt c/o inability to ambulate because unable to "support myself". Patient reports chronic L knee pain. This is the 3rd ED visit for similar type of cc, although usually complains of knee pain > weakness. Was previously diagnosed with osteoarthritis of the R knee and needs L knee replacement. Has an appointment with the orthopedist on 8/29/2017. Pt has been getting steroid injections in her L knee. Started 3 weeks ago and gets injection 1/week (last one today 8/16). Also uses diclofenac (2-3 pills/day) and Tylenol (1 pill/day) PRN for joint pain. Pt reports pain in her neck but denies trauma, falls, numbness or tingling to toes or fingers, HA, chest pain, SOB, n/v/d, fevers, chills, urinary/bowel incontinence. Denies signs and symptoms of cord compression or cauda equina.    In ED:   Vital Signs  T(C): 37.1 (16 Aug 2017 18:52), Max: 37.1 (16 Aug 2017 18:52)  T(F): 98.7 (16 Aug 2017 18:52), Max: 98.7 (16 Aug 2017 18:52)  HR: 54 (16 Aug 2017 18:52) (54 - 54)  BP: 142/84 (16 Aug 2017 18:52) (142/84 - 176/86)  RR: 16 (16 Aug 2017 18:52) (16 - 16)  SpO2: 98% (16 Aug 2017 18:52) (98% - 99%)  C-spine MRI ordered.   Admitted to [facility] regional medical floor for PT eval, rehab. 65yo F with PMH of HTN, HLD, gastric ulcer, L knee osteoarthritis presented to [facility] ED with L knee pain and inability to ambulate. Patient was told by her neurologist Dr. Covarrubias to come to the ED due to UE weakness and neck cramping in his office today. Pt also c/o inability to ambulate/chronic L knee pain that leads to inability to "support myself". This has been happening for a month. This is the 3rd ED visit for similar type of cc, although usually complains of knee pain > weakness. Was previously diagnosed with osteoarthritis of the R knee and needs L knee replacement. Has an appointment with the orthopedist on 8/25/17 & 8/29/2017. Pt has been getting steroid injections in her L knee. Started 3 weeks ago and gets injection 1/week (last one today 8/16). Also uses diclofenac (2-3 pills/day) and Tylenol (1 pill/day) PRN for joint pain. Pt reports pain in her neck but denies trauma, falls, numbness or tingling to toes or fingers, HA, chest pain, SOB, n/v/d, fevers, chills, urinary/bowel incontinence. Denies signs and symptoms of cord compression or cauda equina.    In ED:   Vital Signs  T(C): 37.1 (16 Aug 2017 18:52), Max: 37.1 (16 Aug 2017 18:52)  T(F): 98.7 (16 Aug 2017 18:52), Max: 98.7 (16 Aug 2017 18:52)  HR: 54 (16 Aug 2017 18:52) (54 - 54)  BP: 142/84 (16 Aug 2017 18:52) (142/84 - 176/86)  RR: 16 (16 Aug 2017 18:52) (16 - 16)  SpO2: 98% (16 Aug 2017 18:52) (98% - 99%)  C-spine MRI ordered.   Admitted to [facility] regional medical floor for PT eval, rehab.

## 2017-08-16 NOTE — ED PROVIDER NOTE - DIAGNOSTIC INTERPRETATION
ls spine: no compression fx, normal alignment  cxr: no infiltrate, no effusion, normal bony structures

## 2017-08-16 NOTE — ED PROVIDER NOTE - OBJECTIVE STATEMENT
The pt is a 65 y/o F, who presents to ED stating "my neurologist sent me to be admitted" - pt c/o weakness and inability to walk. Pt The pt is a 67 y/o F, who presents to ED stating "my neurologist sent me to be admitted" - pt c/o weakness and inability to walk. Pt states that has "bad arthritis" to L knee and has been told that needs knee replacement, but over past mon has been to ED twice, and today went to see neuro and told not to be d/c'd. States that is unable to walk - states that feels like she is unable to "support" her body, + pain to knees (L>R), and "cramp" in neck. Denies falls, numbness or tingling to toes or fingers, h/a, cp, sob, n/v/d, fevers, chills

## 2017-08-16 NOTE — H&P ADULT - PMH
Gastric ulcer    HLD (hyperlipidemia)    HTN (hypertension)    Osteoarthritis of knee, unilateral  left knee

## 2017-08-16 NOTE — H&P ADULT - PROBLEM SELECTOR PLAN 5
On Zetia 10mg daily and atorvastatin 20mg daily; patient hasn't filled her medications for the past year  - c/w atorvastatin 20mg PO QHS

## 2017-08-16 NOTE — H&P ADULT - NSHPLABSRESULTS_GEN_ALL_CORE
11.8   10.5  )-----------( 277      ( 16 Aug 2017 16:59 )             34.3   08-16    142  |  103  |  9   ----------------------------<  119<H>  4.3   |  27  |  0.80    Ca    9.3      16 Aug 2017 16:59    TPro  7.6  /  Alb  3.9  /  TBili  0.4  /  DBili  x   /  AST  12  /  ALT  14  /  AlkPhos  64  08-16    Sedimentation Rate, Erythrocyte (08.16.17 @ 16:59)    Sedimentation Rate, Erythrocyte: 43 mm/Hr    EKG: NSR, rate 63    Lumbar xray results pending

## 2017-08-16 NOTE — ED ADULT TRIAGE NOTE - OTHER COMPLAINTS
Patient reports ankle swelling and neck cramping x3 days. Denies any chest pain or shortness of breath. Reports unable to ambulate.

## 2017-08-16 NOTE — H&P ADULT - PROBLEM SELECTOR PLAN 1
Patient with long-standing hx of L knee osteoarthritis. 3rd ED visit for knee pain. Has appointment with orthopedist on 8/29/2017. Received steroid injections.   - home diclofenac 50mg q8h; holding due to no pain on exam   - Tylenol 650mg q6h PRN  - PT consult ordered Patient with long-standing hx of L knee osteoarthritis. 3rd ED visit for knee pain. Has appointment with orthopedist on 8/29/2017. Received steroid injections at [facility].   - home diclofenac 25mg q8h; holding due to no pain on exam   - Tylenol 650mg q6h PRN moderate pain   - PT consult ordered  - f/u Ortho recs, lumbar xray results

## 2017-08-16 NOTE — H&P ADULT - PROBLEM SELECTOR PLAN 2
Pt sent to ED from neurologist office due to UE weakness and neck cramping. No weakness found on exam in ED. Ddx: cervical spine compression vs. cervical myelitis vs. cervical spondylosis.  - C-spine MRI ordered  - following with Dr. Covarrubias, neurologist

## 2017-08-16 NOTE — ED PROVIDER NOTE - PROGRESS NOTE DETAILS
was seen by dr lee as outpatient - does not want pt admitted to his service, recommending c spine mri because felt that pt had ue weakness in his office

## 2017-08-16 NOTE — CONSULT NOTE ADULT - SUBJECTIVE AND OBJECTIVE BOX
66F PMH HTN, HLD p/w weakness x1 month. Pt. reports she was in her usual state of health, working as a , when she started having progressive weakness affecting her daily activities. She reports she started having trouble walking, then trouble holding herself up, and this progressed over the past month. She also reported being unable to "wring out a dishrag". Pt. also c/o diffuse numbness/tingling. This is he 3rd ED visit for similar c/o. Of not pt. has L knee OA and follows w/ Dr. Miller. Pt. was sent in by Dr. Covarrubias (neurology) for UE weakness and neck pain. MRI C-spine showed moderate cord compression at C3-5. Admitted to medicine, received 10mg Decadron IV x1.  Denies fevers/chills/recent illness/bowel/bladder incontinence, saddle anesthesia.    Vital Signs Last 24 Hrs  T(C): 36.6 (16 Aug 2017 18:52), Max: 37.1 (16 Aug 2017 18:52)  T(F): 97.9 (16 Aug 2017 18:52), Max: 98.7 (16 Aug 2017 18:52)  HR: 54 (16 Aug 2017 18:52) (54 - 60)  BP: 142/83 (16 Aug 2017 18:52) (142/83 - 176/86)  BP(mean): --  RR: 16 (16 Aug 2017 18:52) (16 - 17)  SpO2: 98% (16 Aug 2017 18:52) (98% - 99%)                          11.8   10.5  )-----------( 277      ( 16 Aug 2017 16:59 )             34.3     PE:  NAD, lying comfortably in bed  Neck: painless ROM, flex/ext, lateral bending, non-TTP  Motor: 5/5 b/l UE bi/tri/delt/HG            4/5 LLE Q/IP/EHL/FHL/TA/GS            5/5 RLE  Q/IP/EHL/FHL/TA/GS  Sensory: SILT b/l UE, LE  Pulses: wwp b/l extremities  Rectal exam: +tone    < from: MRI Cervical Spine w/wo Cont (08.16.17 @ 20:37) >  Impression:     Degenerative changes of the cervical spine    Abnormal T2 signal hyperintensity extending from C3/C4-C4/C5 within the   spinal cord at the C3/C4 level consistent with edema and/or myelomalacia.    C3/C4  disc osteophyte complex compressing the ventral spinal cord with   left and moderate right foraminal narrowing. C3/C4 moderate to severe   spinal cord compression.    C4/C5  diffuse disc bulge and osteophyte flattening the ventral spinal   cord with severe bilateral foraminal narrowing. C4/C5   mild spinal cord   compression.    C6/C7 no evidence of a focal disc herniation with severe bilateral   foraminal narrowing. No evidence of spinal cord compression.      < end of copied text >      A/P: 66F p/w weakness x1 month w/ cervical cord compression, cord signal changes on C-spine MRI    -Admitted to medicine for pain control/medical mgmt  -Although cord compression evident on MRI, no clinical signs of acute cord compression given motor and sensory exam, and no immediate surgical intervention indicated  -Obtain MRI L-spine for further cord assessment and given LE weakness  -XR C-spine w/ flexion/extension views to assess stability  -CT c-spine to evaluate if presence of calcified discs/OPLL  -Decadron IV 6mg Q6h  -Frequent neuro checks, monitor signs for acute change in exam  -Ortho spine to follow  -Discussed w/ Dr. Martin

## 2017-08-17 DIAGNOSIS — G95.20 UNSPECIFIED CORD COMPRESSION: ICD-10-CM

## 2017-08-17 DIAGNOSIS — K59.00 CONSTIPATION, UNSPECIFIED: ICD-10-CM

## 2017-08-17 LAB
ANION GAP SERPL CALC-SCNC: 14 MMOL/L — SIGNIFICANT CHANGE UP (ref 5–17)
APTT BLD: 36.3 SEC — SIGNIFICANT CHANGE UP (ref 27.5–37.4)
BLD GP AB SCN SERPL QL: NEGATIVE — SIGNIFICANT CHANGE UP
BUN SERPL-MCNC: 11 MG/DL — SIGNIFICANT CHANGE UP (ref 7–23)
CALCIUM SERPL-MCNC: 9.6 MG/DL — SIGNIFICANT CHANGE UP (ref 8.4–10.5)
CHLORIDE SERPL-SCNC: 104 MMOL/L — SIGNIFICANT CHANGE UP (ref 96–108)
CO2 SERPL-SCNC: 25 MMOL/L — SIGNIFICANT CHANGE UP (ref 22–31)
CREAT SERPL-MCNC: 0.6 MG/DL — SIGNIFICANT CHANGE UP (ref 0.5–1.3)
GLUCOSE SERPL-MCNC: 154 MG/DL — HIGH (ref 70–99)
HBA1C BLD-MCNC: 5.6 % — SIGNIFICANT CHANGE UP (ref 4–5.6)
HCT VFR BLD CALC: 36.5 % — SIGNIFICANT CHANGE UP (ref 34.5–45)
HCV AB S/CO SERPL IA: 0.09 S/CO — SIGNIFICANT CHANGE UP
HCV AB SERPL-IMP: SIGNIFICANT CHANGE UP
HGB BLD-MCNC: 12.3 G/DL — SIGNIFICANT CHANGE UP (ref 11.5–15.5)
INR BLD: 1.18 — HIGH (ref 0.88–1.16)
MCHC RBC-ENTMCNC: 30.6 PG — SIGNIFICANT CHANGE UP (ref 27–34)
MCHC RBC-ENTMCNC: 33.7 G/DL — SIGNIFICANT CHANGE UP (ref 32–36)
MCV RBC AUTO: 90.8 FL — SIGNIFICANT CHANGE UP (ref 80–100)
PLATELET # BLD AUTO: 268 K/UL — SIGNIFICANT CHANGE UP (ref 150–400)
POTASSIUM SERPL-MCNC: 4.4 MMOL/L — SIGNIFICANT CHANGE UP (ref 3.5–5.3)
POTASSIUM SERPL-SCNC: 4.4 MMOL/L — SIGNIFICANT CHANGE UP (ref 3.5–5.3)
PROTHROM AB SERPL-ACNC: 13.1 SEC — HIGH (ref 9.8–12.7)
RBC # BLD: 4.02 M/UL — SIGNIFICANT CHANGE UP (ref 3.8–5.2)
RBC # FLD: 12.3 % — SIGNIFICANT CHANGE UP (ref 10.3–16.9)
RH IG SCN BLD-IMP: POSITIVE — SIGNIFICANT CHANGE UP
SODIUM SERPL-SCNC: 143 MMOL/L — SIGNIFICANT CHANGE UP (ref 135–145)
WBC # BLD: 9.3 K/UL — SIGNIFICANT CHANGE UP (ref 3.8–10.5)
WBC # FLD AUTO: 9.3 K/UL — SIGNIFICANT CHANGE UP (ref 3.8–10.5)

## 2017-08-17 PROCEDURE — 99233 SBSQ HOSP IP/OBS HIGH 50: CPT

## 2017-08-17 PROCEDURE — 72125 CT NECK SPINE W/O DYE: CPT | Mod: 26

## 2017-08-17 PROCEDURE — 72052 X-RAY EXAM NECK SPINE 6/>VWS: CPT | Mod: 26

## 2017-08-17 PROCEDURE — 72148 MRI LUMBAR SPINE W/O DYE: CPT | Mod: 26

## 2017-08-17 RX ORDER — DICLOFENAC SODIUM 75 MG/1
25 TABLET, DELAYED RELEASE ORAL EVERY 8 HOURS
Qty: 0 | Refills: 0 | Status: DISCONTINUED | OUTPATIENT
Start: 2017-08-17 | End: 2017-08-22

## 2017-08-17 RX ORDER — SODIUM CHLORIDE 9 MG/ML
1000 INJECTION, SOLUTION INTRAVENOUS
Qty: 0 | Refills: 0 | Status: DISCONTINUED | OUTPATIENT
Start: 2017-08-17 | End: 2017-08-22

## 2017-08-17 RX ORDER — HEPARIN SODIUM 5000 [USP'U]/ML
5000 INJECTION INTRAVENOUS; SUBCUTANEOUS EVERY 8 HOURS
Qty: 0 | Refills: 0 | Status: DISCONTINUED | OUTPATIENT
Start: 2017-08-17 | End: 2017-08-18

## 2017-08-17 RX ORDER — OXYCODONE HYDROCHLORIDE 5 MG/1
5 TABLET ORAL EVERY 4 HOURS
Qty: 0 | Refills: 0 | Status: DISCONTINUED | OUTPATIENT
Start: 2017-08-17 | End: 2017-08-18

## 2017-08-17 RX ORDER — PANTOPRAZOLE SODIUM 20 MG/1
40 TABLET, DELAYED RELEASE ORAL ONCE
Qty: 0 | Refills: 0 | Status: COMPLETED | OUTPATIENT
Start: 2017-08-17 | End: 2017-08-17

## 2017-08-17 RX ORDER — PANTOPRAZOLE SODIUM 20 MG/1
40 TABLET, DELAYED RELEASE ORAL
Qty: 0 | Refills: 0 | Status: DISCONTINUED | OUTPATIENT
Start: 2017-08-17 | End: 2017-08-22

## 2017-08-17 RX ADMIN — Medication 6 MILLIGRAM(S): at 12:17

## 2017-08-17 RX ADMIN — ATORVASTATIN CALCIUM 20 MILLIGRAM(S): 80 TABLET, FILM COATED ORAL at 21:39

## 2017-08-17 RX ADMIN — Medication 6 MILLIGRAM(S): at 21:39

## 2017-08-17 RX ADMIN — HEPARIN SODIUM 5000 UNIT(S): 5000 INJECTION INTRAVENOUS; SUBCUTANEOUS at 05:21

## 2017-08-17 RX ADMIN — HEPARIN SODIUM 5000 UNIT(S): 5000 INJECTION INTRAVENOUS; SUBCUTANEOUS at 22:00

## 2017-08-17 RX ADMIN — AMLODIPINE BESYLATE 10 MILLIGRAM(S): 2.5 TABLET ORAL at 05:21

## 2017-08-17 RX ADMIN — Medication 6 MILLIGRAM(S): at 04:33

## 2017-08-17 RX ADMIN — PANTOPRAZOLE SODIUM 40 MILLIGRAM(S): 20 TABLET, DELAYED RELEASE ORAL at 16:31

## 2017-08-17 RX ADMIN — Medication 6 MILLIGRAM(S): at 16:31

## 2017-08-17 RX ADMIN — HEPARIN SODIUM 5000 UNIT(S): 5000 INJECTION INTRAVENOUS; SUBCUTANEOUS at 13:29

## 2017-08-17 NOTE — CONSULT NOTE ADULT - ASSESSMENT
66 year old female with MRI evidence of spinal cord compression with myelomalacia vs cord edema at C3-C5 with disc osteophyte complex and clinical signs of myelopathy.    - Case and images to be reviewed w/ Dr. Dsouza,  - Recommend Pepcid or PPI during Decadron use for gi ppx

## 2017-08-17 NOTE — PROGRESS NOTE ADULT - ATTENDING COMMENTS
Patient was seen and examined by me at bedside. I agree with resident's note, subjective, objective physical exam, assessment and plan with following modifications/additions.     Cervical spine cord compression with paresthesias of bilateral UEs. CT C-spine showing osteophytes and chronic degenerative disease. MRI lumbar spine w/o cord compression or herniation. Will discuss with Ortho Spine for final recs. Needs PT eval for DC.  Discharge pending ortho spine final recs and PT eval. Patient was seen and examined by me at bedside. I agree with resident's note, subjective, objective physical exam, assessment and plan with following modifications/additions.     Cervical spine cord compression with paresthesias of bilateral UEs. CT C-spine showing osteophytes and chronic degenerative disease. MRI lumbar spine w/o cord compression or herniation.     Patient is low risk for intermediate risk procedure RCRI 0.9%, plan for cervical fusion tomorrow Patient was seen and examined by me at bedside. I agree with resident's note, subjective, objective physical exam, assessment and plan with following modifications/additions.     Cervical spine cord compression with paresthesias of bilateral UEs. CT C-spine showing osteophytes and chronic degenerative disease. MRI lumbar spine w/o cord compression or herniation.   Ortho spine recs cervical fusion tomorrow.  RCRI: zero (0.9% CV risk). METS>4.  Medically optimized for surgery.  Patient is low risk for intermediate risk procedure. Patient was seen and examined by me at bedside. I agree with resident's note, subjective, objective physical exam, assessment and plan with following modifications/additions.     Cervical spine cord compression with paresthesias of bilateral UEs. CT C-spine showing osteophytes and chronic degenerative disease. MRI lumbar spine w/o cord compression or herniation.   Ortho spine recs cervical fusion tomorrow.  RCRI: zero (0.4% CV risk). METS>4.  Medically optimized for surgery.  Patient is low risk for intermediate risk procedure.

## 2017-08-17 NOTE — PROGRESS NOTE ADULT - PROBLEM SELECTOR PLAN 4
In ED HR 54, pt asymptomatic. This AM HR 56.  - On home atenolol 25mg daily but patient hasn't filled her medications for the past year  - Hold atenolol In ED HR 54, pt asymptomatic. This AM HR 56.  - On home atenolol 25mg daily but patient hasn't filled her medications for the past year  - Hold atenolol  - f/u with PCP Dr. Hills  for collateral on home meds

## 2017-08-17 NOTE — CONSULT NOTE ADULT - SUBJECTIVE AND OBJECTIVE BOX
History of Present Illness:  Chief Complaint/Reason for Admission: L knee pain and inability to ambulate	  History of Present Illness: 	  67yo F with PMH of HTN, HLD, gastric ulcer, L knee osteoarthritis presented to [facility] ED with L knee pain and inability to ambulate. Patient was told by her neurologist Dr. Covarrubias to come to the ED due to UE weakness and neck cramping in his office today. Pt also c/o inability to ambulate/chronic L knee pain that leads to inability to "support myself". This has been happening for a month. This is the 3rd ED visit for similar type of cc, although usually complains of knee pain > weakness. Was previously diagnosed with osteoarthritis of the R knee and needs L knee replacement. Has an appointment with the orthopedist on 8/25/17 & 8/29/2017. Pt has been getting steroid injections in her L knee. Started 3 weeks ago and gets injection 1/week (last one today 8/16). Also uses diclofenac (2-3 pills/day) and Tylenol (1 pill/day) PRN for joint pain. Pt reports pain in her neck but denies trauma, falls, numbness or tingling to toes or fingers, HA, chest pain, SOB, n/v/d, fevers, chills, urinary/bowel incontinence. Denies signs and symptoms of cord compression or cauda equina.    At baseline patient able to ambulate more than one mile without difficulty. Over past few months her leg weakness has been progressively worsening, leading her to quit her job with the [employer]. She does not have any caridac issues and is a nonsmoker. She has no sign fam hx of cardiac disease.   She does have new LE edema and increased the number of pillow while sleeping.   No chest pain. No SOB but decreased activity..      Vital Signs  T(C): 37.1 (16 Aug 2017 18:52), Max: 37.1 (16 Aug 2017 18:52)  T(F): 98.7 (16 Aug 2017 18:52), Max: 98.7 (16 Aug 2017 18:52)  HR: 54 (16 Aug 2017 18:52) (54 - 54)  BP: 142/84 (16 Aug 2017 18:52) (142/84 - 176/86)  RR: 16 (16 Aug 2017 18:52) (16 - 16)  SpO2: 98% (16 Aug 2017 18:52) (98% - 99%)      Review of Systems:  Review of Systems: as per HPI	      Allergies and Intolerances:        Allergies:  	Allergy Status Unknown:        Intolerances:  	aspirin: Drug, Other, gastric bleeding    Home Medications:   * Patient Currently Takes Medications as of 16-Aug-2017 19:41 documented in Prescription Writer  · 	diclofenac potassium 50 mg oral tablet: 1 tab(s) orally every 8 hours  · 	atenolol 25 mg oral tablet: 1 tab(s) orally once a day, Last Dose Taken:    · 	atorvastatin 20 mg oral tablet: 1 tab(s) orally once a day, Last Dose Taken:    · 	amLODIPine 10 mg oral tablet: 1 tab(s) orally once a day, Last Dose Taken:      . .    Patient History:   Past Medical History:  Gastric ulcer    HLD (hyperlipidemia)    HTN (hypertension)    Osteoarthritis of knee, unilateral  left knee.    Past Surgical History:  H/O excision of mass  plantar surface of left foot  S/P ovarian cystectomy.    Family History:  No pertinent family history in first degree relatives.    Social History:  Social History (marital status, living situation, occupation, tobacco use, alcohol and drug use, and sexual history): Pt's daughter lives with patient and helps her with ADLs (bathing, cooking).  Pt used to work for [employer] but hasn't been working for the past month. Never smoker. Never IVDU. Occasional alcohol intake. Uses walker to ambulate, occasionally wheelchair	    Tobacco Screening:  · Core Measure Site	No	  · Has the patient used tobacco in the past 30 days?	No	    Risk Assessment:   Present on Admission:  Deep Venous Thrombosis	no	  Pulmonary Embolus	no	    Heart Failure:  Does this patient have a history of or has been diagnosed with heart failure? no.    Hepatitis C Screen (per [facility] Department of Health, hepatitis C screening must be offered to every individual born between 1945 and 1965)	Offered and patient accepted	      Physical Exam:  Physical Exam: PHYSICAL EXAM:  · CONSTITUTIONAL: obese woman lying comfortably in bed, NAD · HEENT: Airway patent, Nasal mucosa clear. MMM, conjunctiva clear bilaterally, pupils equal, round and reactive to light. · CARDIAC: Normal rate, regular rhythm.+S1, S2. No murmurs, rubs or gallops. · RESPIRATORY: Breath sounds clear and equal bilaterally. No wheezes or crackles.  · GASTROINTESTINAL: Abdomen soft, non-tender, non-distended, no guarding. · MUSCULOSKELETAL: no spinal tenderness, no TTP of knee joint b/l, no knee effusion, warmth, or swelling, no rash, FROM, requires assistance to sit up, FROM x 4 when supine, muscle strength 5/5 b/l x 4; pt w/limited mobility  · NEUROLOGICAL: AAOx3, CN II-XII grossly intact, no focal deficits, no motor or sensory deficits. · SKIN: warm, dry and intact. · EXTREMITIES: +1 LE edema b/l, no warmth, erythema, or TTP	      Labs and Results:  Labs, Radiology, Cardiology, and Other Results: 11.8  10.5  )-----------( 277      ( 16 Aug 2017 16:59 )            34.3  08-16  142  |  103  |  9  ----------------------------<  119<H> 4.3   |  27  |  0.80  Ca    9.3      16 Aug 2017 16:59  TPro  7.6  /  Alb  3.9  /  TBili  0.4  /  DBili  x   /  AST  12  /  ALT  14  /  AlkPhos  64  08-16  Sedimentation Rate, Erythrocyte (08.16.17 @ 16:59)   Sedimentation Rate, Erythrocyte: 43 mm/Hr  EKG: NSR, rate 63  	    Assessment and Plan:   Assessment:  		  66F with PMH of HTN, HLD, gastric ulcer, L knee osteoarthritis was sent to [facility] ED by neurologist for UE weakness and c/o L knee weakness and inability to ambulate. Admitted for PT eval and work-up for cervical spine compression for surgical repair  - home diclofenac 25mg q8h; holding due to no pain on exam   - Tylenol 650mg q6h PRN moderate pain   - C-spine MRI revealed cord compression.   - for surgical repair. Moderate risk candidate for moderate risk surgery. Would check echo prior to surgery in light of new edema and increased difficulty lying flat.    - c/w atorvastatin 20mg PO QHS.     : DASH diet     -DVT ppx: SubQ heparin 7500U Q8h  - Case discussed in detail with Derek Potts (PCP) and Dr. Martin (Ortho)    FULL CODE.

## 2017-08-17 NOTE — PROGRESS NOTE ADULT - SUBJECTIVE AND OBJECTIVE BOX
SUBJECTIVE/OVERNIGHT EVENTS:    OBJECTIVES:    VITAL SIGNS:  T(F): 97.7 (08-17-17 @ 08:40)  HR: 68 (08-17-17 @ 08:40)  BP: 137/84 (08-17-17 @ 08:40)  RR: 19 (08-17-17 @ 08:40)  SpO2: 99% (08-17-17 @ 08:40)      PHYSICAL EXAM:  Constitutional: obese woman, appears stated age, lying in bed, no distress   Head: NC/AT  Eyes: PERRL, EOMI, clear conjunctiva  ENT: no nasal discharge; uvula midline, no oropharyngeal erythema or exudates; MMM  Neck: supple  Respiratory: CTA B/L; no W/R/R, no retractions  Cardiac: +S1/S2; tachycardic, regular rhythm, no M/R/G  Gastrointestinal: soft, NT/ND; no rebound or guarding; +BSx4  Extremities: +1 LE edema b/l, no warmth, erythema, or TTP  Musculoskeletal: no spinal tenderness, no TTP of knee joint b/l, no knee effusion, warmth, or swelling, no rash, FROM, requires assistance to sit up, FROM x 4 when supine, muscle strength 5/5 b/l UE and R LE, 4/5 L hip and knee flexion; pt w/limited mobility   Vascular: 2+ radial and DP pulses B/L  Dermatologic: skin warm, dry and intact; no rashes, wounds; surgical scar on L plantar region  Neurologic: AAOx3, CN II-XII grossly intact, no focal deficits, no motor or sensory deficits.  Psychiatric: affect and characteristics of appearance, verbalizations, behaviors are appropriate               12.3   9.3   )-----------( 268      ( 17 Aug 2017 08:42 )             36.5     08-17    143  |  104  |  11  ----------------------------<  154<H>  4.4   |  25  |  0.60    Ca    9.6      17 Aug 2017 08:42    TPro  7.6  /  Alb  3.9  /  TBili  0.4  /  DBili  x   /  AST  12  /  ALT  14  /  AlkPhos  64  08-16          RADIOLOGY & ADDITIONAL TESTS: SUBJECTIVE/OVERNIGHT EVENTS: pt seen and examined at bedside. C/o neck pain particularly when lying down. Patient slept in chair until 5am to relieve the pain. Also c/o upper + lower extremity tingling and lower extremity warmth. Denies urinary/bowel incontinence but reports constipation (last BM Saturday).     OBJECTIVES:    VITAL SIGNS:  T(F): 97.7 (08-17-17 @ 08:40)  HR: 68 (08-17-17 @ 08:40)  BP: 137/84 (08-17-17 @ 08:40)  RR: 19 (08-17-17 @ 08:40)  SpO2: 99% (08-17-17 @ 08:40)      PHYSICAL EXAM:  Constitutional: obese woman, appears stated age, lying in bed, no distress   Head: NC/AT  Eyes: PERRL, EOMI, clear conjunctiva  ENT: no nasal discharge; uvula midline, no oropharyngeal erythema or exudates; MMM  Neck: supple  Respiratory: CTA B/L; no W/R/R, no retractions  Cardiac: +S1/S2; tachycardic, regular rhythm, no M/R/G  Gastrointestinal: soft, NT, distended; no rebound or guarding; +BSx4  Extremities: +1 LE edema b/l, no warmth, erythema, or TTP  Musculoskeletal: no spinal tenderness, no TTP of knee joint b/l, no knee effusion, warmth, or swelling, no rash, FROM, requires assistance to sit up, FROM x 4 when supine, muscle strength 5/5 b/l UE and R LE, 4/5 L hip and knee flexion; pt w/limited mobility   Vascular: 2+ radial and DP pulses B/L  Dermatologic: skin warm, dry and intact; no rashes, wounds; surgical scar on L plantar region  Neurologic: AAOx3, CN II-XII grossly intact, no focal deficits, no motor or sensory deficits.  Psychiatric: affect and characteristics of appearance, verbalizations, behaviors are appropriate               12.3   9.3   )-----------( 268      ( 17 Aug 2017 08:42 )             36.5     08-17    143  |  104  |  11  ----------------------------<  154<H>  4.4   |  25  |  0.60    Ca    9.6      17 Aug 2017 08:42    TPro  7.6  /  Alb  3.9  /  TBili  0.4  /  DBili  x   /  AST  12  /  ALT  14  /  AlkPhos  64  08-16          RADIOLOGY & ADDITIONAL TESTS:

## 2017-08-17 NOTE — PROGRESS NOTE ADULT - PROBLEM SELECTOR PLAN 1
Pt sent to ED from neurologist office due to UE weakness and neck cramping. Pt c/o neck pain when lying down and tingling in her b/l UE, particularly index fingers. Also reports tingling and feeling of warmth in b/l LE.  - C-spine MRI: degenerative changes of the cervical spine with spinal cord   compression.  - C - CT spine ordered  - x-ray C-spine flex/ext ordered to assess stability  - Frequent neuro checks, monitor signs for acute change in exam  - Ortho spine to follow  - received 1 dose decadron 10mg IV  - on decadron 6mg IV q6h; ISS  - neurosurgery consult  - following with Dr. Covarrubias, neurologist: [phone] Pt sent to ED from neurologist office due to UE weakness and neck cramping. Pt c/o neck pain when lying down and tingling in her b/l UE, particularly index fingers. Also reports tingling and feeling of warmth in b/l LE.  - C-spine MRI: degenerative changes of the cervical spine with spinal cord   compression.  - C - CT spine ordered  - x-ray C-spine flex/ext ordered to assess stability  - Frequent neuro checks, monitor signs for acute change in exam  - Ortho spine to follow  - received 1 dose decadron 10mg IV  - on decadron 6mg IV q6h; ISS  - following with Dr. Covarrubias, neurologist: [phone] Pt sent to ED from neurologist office due to UE weakness and neck cramping. Pt c/o neck pain when lying down and tingling in her b/l UE, particularly index fingers. Also reports tingling and feeling of warmth in b/l LE.  - C-spine MRI: degenerative changes of the cervical spine with spinal cord   compression.  - C - CT spine ordered  - x-ray C-spine flex/ext ordered to assess stability  - Frequent neuro checks, monitor signs for acute change in exam  - received 1 dose decadron 10mg IV  - on decadron 6mg IV q6h; ISS  - Ortho spine to follow  - neurosurgery consulted o/n; agree with ortho recs, additional imaging and decadron   - following with Dr. Covarrubias, neurologist: [phone] Pt sent to ED from neurologist office due to UE weakness and neck cramping. Pt c/o neck pain when lying down and tingling in her b/l UE, particularly index fingers. Also reports tingling and feeling of warmth in b/l LE.  - C-spine MRI: degenerative changes of the cervical spine with spinal cord   compression.  - C - CT spine: Degenerative changes of the cervical spine most prominent at C3/C4, C3/C4 moderate spinal cord compression, C4/C5 mild spinal cord compression.C6/C7 severe left and moderate to severe right foraminal narrowing.    - x-ray C-spine flex/ext ordered to assess stability  - Frequent neuro checks, monitor signs for acute change in exam  - received 1 dose decadron 10mg IV  - on decadron 6mg IV q6h; ISS  - Ortho spine to follow  - neurosurgery consulted o/n; agree with ortho recs, additional imaging and decadron   - following with Dr. Covarrubias, neurologist: [phone]

## 2017-08-17 NOTE — PHYSICAL THERAPY INITIAL EVALUATION ADULT - GAIT DEVIATIONS NOTED, PT EVAL
decreased katerin/decreased step length/bilateral foot clearance decreased as patient became fatigued, +knee bucking x 1 requiring assist to recover, forward flexed posture

## 2017-08-17 NOTE — PHYSICAL THERAPY INITIAL EVALUATION ADULT - ADDITIONAL COMMENTS
Patient states she was very independent, becoming less so. Her daughter and friend have been coming to help her. She recently started walking with rolling walker however increased instances of knee bucking so she purchased a wheelchair. Patient endorsees multiple falls over last 3 months.

## 2017-08-17 NOTE — PROGRESS NOTE ADULT - SUBJECTIVE AND OBJECTIVE BOX
Ortho Preop Note    Patient is a 66y old  Female who presents with a chief complaint of L knee pain and inability to ambulate (16 Aug 2017 18:52)    Diagnosis: C3-C5 cord compression  Procedure: cervical fusion  Surgeon: Dr. Martin                          12.3   9.3   )-----------( 268      ( 17 Aug 2017 08:42 )             36.5     08-17    143  |  104  |  11  ----------------------------<  154<H>  4.4   |  25  |  0.60    Ca    9.6      17 Aug 2017 08:42    TPro  7.6  /  Alb  3.9  /  TBili  0.4  /  DBili  x   /  AST  12  /  ALT  14  /  AlkPhos  64  08-16          [ ] Type & Screen  [x] CBC  [x] BMP  [ ] PT/PTT/INR  [ ] Urinalysis  [x] Chest X-ray  [ ] EKG  [x] NPO/IVF  [ ] Consent  [ ] Clearance - primary team  [ ] Added on to OR Schedule  [x] Anti-coagulation held - HSQ held AM of surgery     Assessment & Plan:  66yFemale with C3-C5 cord compression  - For OR tomorrow 8/18/17    Ortho Pager [phone] Ortho Preop Note    Diagnosis: C3-C5 cord compression  Procedure: ACDF C3-5, possible posterior fusion  Surgeon: Dr. Martin                          12.3   9.3   )-----------( 268      ( 17 Aug 2017 08:42 )             36.5     08-17    143  |  104  |  11  ----------------------------<  154<H>  4.4   |  25  |  0.60    Ca    9.6      17 Aug 2017 08:42    TPro  7.6  /  Alb  3.9  /  TBili  0.4  /  DBili  x   /  AST  12  /  ALT  14  /  AlkPhos  64  08-16          [ ] Type & Screen  [x] CBC  [x] BMP  [ ] PT/PTT/INR  [ ] Urinalysis  [x] Chest X-ray  [x] EKG  [x] NPO/IVF  [ ] Consent  [ ] Clearance - primary team  [x] Added on to OR Schedule  [x] Anti-coagulation held - HSQ held AM of surgery     Assessment & Plan:  66yFemale with C3-C5 cord compression  - For OR tomorrow 8/18/17    Ortho Pager [phone]

## 2017-08-17 NOTE — PROGRESS NOTE ADULT - ASSESSMENT
66F with PMH of HTN, HLD, gastric ulcer, L knee osteoarthritis was sent to [facility] ED by neurologist for UE weakness and neck cramping. Also c/o L knee weakness and inability to ambulate. Found to have LLE weakness on exam and neck pain. C-spine MRI showing evidence of cervical spinal cord compression. Admitted for work-up for cervical spine compression vs. cervical myelitis vs. cervical spondylosis. 66F with PMH of HTN, HLD, gastric ulcer, L knee osteoarthritis was sent to [facility] ED by neurologist for UE weakness and neck cramping. Also c/o L knee weakness and inability to ambulate. Found to have LLE weakness on exam and tingling/number in all 4 extremities. C-spine MRI showing evidence of cervical spinal cord compression. Admitted for work-up for cervical spine compression.

## 2017-08-17 NOTE — PHYSICAL THERAPY INITIAL EVALUATION ADULT - PERTINENT HX OF CURRENT PROBLEM, REHAB EVAL
Patient is a 66 year old female presented to [facility] ED with L knee pain and inability to ambulate. Patient was told by her neurologist Dr. Covarrubias to come to the ED due to UE weakness and neck cramping in his office today. Pt also c/o inability to ambulate/chronic L knee pain that leads to inability to "support myself". MRI evidence of spinal cord compression with myelomalacia vs cord edema at C3-C5 with disc osteophyte complex and clinical signs of myelopathy.

## 2017-08-17 NOTE — PROGRESS NOTE ADULT - PROBLEM SELECTOR PLAN 2
Patient with long-standing hx of L knee osteoarthritis. 3rd ED visit for knee pain. Has appointment with orthopedist on 8/29/2017. Received steroid injections at [facility].   - home diclofenac 25mg q8h; holding due to no pain on exam   - Tylenol 650mg PO q6h PRN moderate pain   - oxycodone 5mg PO q4h PRN for severe pain  - PT consult ordered  - Ortho spine to follow  - C - MRI lumbar spine ordered for LE weakness to r/o cord compression  - f/u with Dr. Miller outpatient for OA Patient with long-standing hx of L knee osteoarthritis. 3rd ED visit for knee pain. Has appointment with orthopedist on 8/29/2017. Received steroid injections at [facility]. On home diclofenac 25mg q8h.  - Tylenol 650mg PO q6h PRN moderate pain   - oxycodone 5mg PO q4h PRN for severe pain  - PT consult ordered  - Ortho spine to follow  - C - MRI lumbar spine ordered for LE weakness to r/o cord compression  - f/u with Dr. Miller outpatient for OA Patient with long-standing hx of L knee osteoarthritis. 3rd ED visit for knee pain. Has appointment with orthopedist on 8/29/2017. Received steroid injections at [facility]. On home diclofenac 25mg q8h.  - Tylenol 650mg PO q6h PRN moderate pain   - oxycodone 5mg PO q4h PRN for severe pain  - PT consult ordered  - Ortho spine to follow  - C - MRI lumbar spine: degenerative changes with anterolisthesis of L4 on L5, L4/L5   moderate to severe spinal canal stenosis, L3/L4 mild to moderate spinal canal stenosis.  - f/u with Dr. Miller outpatient for OA

## 2017-08-17 NOTE — CHART NOTE - NSCHARTNOTEFT_GEN_A_CORE
SUBJECTIVE: Patient seen and examined. Admitted to medical service. Was seen by neurologist and asked to come to ED because of acute and progressive UE and LE weakness. No new bowel or  bladder changes. no fever or chills. no recent trauma or procedure. Usual state of health four weeks prior. Had to stop working 4 weeks ago because of the weakness. Community ambulator before 4 wks and now requires walker. Unable ambulate freely without assistance. no falls. radiating pain and dysesthesias bilateral UE. Rt hand dominant. Unable sign her name and write freely with pen like before.     OBJECTIVE: stooped posture with standing. wide based gait.     Vital Signs Last 24 Hrs  T(C): 36.7 (17 Aug 2017 15:54), Max: 36.7 (17 Aug 2017 15:54)  T(F): 98 (17 Aug 2017 15:54), Max: 98 (17 Aug 2017 15:54)  HR: 63 (17 Aug 2017 15:54) (56 - 68)  BP: 125/78 (17 Aug 2017 15:54) (123/71 - 137/84)  BP(mean): --  RR: 18 (17 Aug 2017 15:54) (18 - 19)  SpO2: 99% (17 Aug 2017 15:54) (97% - 99%)    Affected extremity:                  Sensation:           Upper extremity                ax        mc           m          u          r                                                         R          decreased to light touch                                               L           decreased to light touch         Lower extremeity             sp         dp         saph       zuñiga         tibial                                                R          intact                                               L          intact         Motor exam:          Upper extremity              Bi(c5)  WE(c6)  EE(c7)   FF(c8)                                                R         4/5        4/5        4/5       4/5                                               L          4/5        4/5        4/5       4/5         Lower extremeity          HF(l2)   KE(l3)    TA(l4)   EHL(l5)  GS(s1)                                                 R        3/5        3/5        5/5       5/5         5/5                                               L         3/5        3/5       5/5       5/5          5/5                                                      warm well perfused; capillary refill <3 seconds              LABS:                        12.3   9.3   )-----------( 268      ( 17 Aug 2017 08:42 )             36.5     08-17    143  |  104  |  11  ----------------------------<  154<H>  4.4   |  25  |  0.60    Ca    9.6      17 Aug 2017 08:42    TPro  7.6  /  Alb  3.9  /  TBili  0.4  /  DBili  x   /  AST  12  /  ALT  14  /  AlkPhos  64  08-16    PT/INR - ( 17 Aug 2017 18:49 )   PT: 13.1 sec;   INR: 1.18          PTT - ( 17 Aug 2017 18:49 )  PTT:36.3 sec      A/P :  65y/o female with acute and progressive cervical myelopathy  MRI of cervical spine consistent with C3-4 and C4-5 severe central and forminal compression with myelomylacia  Given the rapid progression of weakness and severe compression, patient indicated for surgical decompression.  understands the natural history of cervical myelopathy and given the rapid and severe effect on function, no further   non operative treatment recommended nor indicated.  plan is for medical optimization and plan for surgery for decompression and stabilization once medically optimized  plan NPO after midnight

## 2017-08-17 NOTE — CONSULT NOTE ADULT - SUBJECTIVE AND OBJECTIVE BOX
HISTORY OF PRESENT ILLNESS: 66 year old female with HTN, HLD, OA admitted to medical service with reports of one month of progressive extremity weakness with inability to walk. Patient states onset of weakness and difficulty walking was spontaneous and has worsened over time. She reports neck pain, upper extremity weakness affecting her  with paresthesias, and inability to take more than a few steps with the assistance of a walker. She never required any support ambulating previously and works as a  for the [employer]. She also has chronic left knee pain which was one of her presenting ER symptoms. She reports lower back pain for 2 weeks now. She denies any urinary or bowel changes. She denies any recent trauma, injury or illness. She denies any use of anticoagulation. Multiple images performed while inpatient with impressions listed below. Neurosurgery called by Dr. Covarrubias for a second opinion following Orthopedic surgery evaluation.    PAST MEDICAL & SURGICAL HISTORY:  Osteoarthritis of knee, unilateral: left knee  Gastric ulcer  HLD (hyperlipidemia)  HTN (hypertension)  H/O excision of mass: plantar surface of left foot  S/P ovarian cystectomy    FAMILY HISTORY:  No pertinent family history in first degree relatives      SOCIAL HISTORY:  Tobacco Use: Denies  EtOH use:  Denies  Substance: Denies    Allergies    Allergy Status Unknown    Intolerances    aspirin (Other)      REVIEW OF SYSTEMS  None other than listed in HPI.    MEDICATIONS:  Antibiotics:    Neuro:  oxyCODONE    IR 5 milliGRAM(s) Oral every 4 hours PRN  diclofenac 25 milliGRAM(s) Oral every 8 hours PRN    Anticoagulation:  heparin  Injectable 5000 Unit(s) SubCutaneous every 8 hours    OTHER:  amLODIPine   Tablet 10 milliGRAM(s) Oral daily  atorvastatin 20 milliGRAM(s) Oral at bedtime  insulin lispro (HumaLOG) corrective regimen sliding scale   SubCutaneous Before meals and at bedtime  dextrose Gel 1 Dose(s) Oral once PRN  dextrose 50% Injectable 12.5 Gram(s) IV Push once  dextrose 50% Injectable 25 Gram(s) IV Push once  dextrose 50% Injectable 25 Gram(s) IV Push once  glucagon  Injectable 1 milliGRAM(s) IntraMuscular once PRN  dexamethasone  Injectable 6 milliGRAM(s) IV Push every 6 hours  bisacodyl 5 milliGRAM(s) Oral every 12 hours PRN  pantoprazole    Tablet 40 milliGRAM(s) Oral before breakfast    IVF:  dextrose 5%. 1000 milliLiter(s) IV Continuous <Continuous>      Vital Signs Last 24 Hrs  T(C): 36.5 (17 Aug 2017 08:40), Max: 37.1 (16 Aug 2017 18:52)  T(F): 97.7 (17 Aug 2017 08:40), Max: 98.7 (16 Aug 2017 18:52)  HR: 68 (17 Aug 2017 08:40) (54 - 68)  BP: 137/84 (17 Aug 2017 08:40) (123/71 - 176/86)  BP(mean): --  RR: 19 (17 Aug 2017 08:40) (16 - 19)  SpO2: 99% (17 Aug 2017 08:40) (97% - 99%)    PHYSICAL EXAM:  Gen: NAD, AAOx3. Sitting comfortably in a chair.  HEENT: PERRL. EOMI. NC/AT. VF grossly intact.  Neck: Limited ROM throughout secondary to pain. Nontender to palpation.  Back: nontender to palpation  Neuro: CNs II-XII intact. LUE and LLE 4+/5 throughout, RUE/RLE 5/5 throughout. Sensation to LT intact throughout. Following commands. Hypperreflexic throughout all four extremities more pronounced on the left with +Hoffmans and Clonus on left side. Gait not assessed.    LABS:                        12.3   9.3   )-----------( 268      ( 17 Aug 2017 08:42 )             36.5     08-17    143  |  104  |  11  ----------------------------<  154<H>  4.4   |  25  |  0.60    Ca    9.6      17 Aug 2017 08:42    TPro  7.6  /  Alb  3.9  /  TBili  0.4  /  DBili  x   /  AST  12  /  ALT  14  /  AlkPhos  64  08-16        CULTURES:      RADIOLOGY & ADDITIONAL STUDIES:   < from: MRI Cervical Spine w/wo Cont (08.16.17 @ 20:37) >    Degenerative changes of the cervical spine    Abnormal T2 signal hyperintensity extending from C3/C4-C4/C5 within the   spinal cord at the C3/C4 level consistent with edema and/or myelomalacia.    C3/C4  disc osteophyte complex compressing the ventral spinal cord with   left and moderate right foraminal narrowing. C3/C4 moderate to severe   spinal cord compression.    C4/C5  diffuse disc bulge and osteophyte flattening the ventral spinal   cord with severe bilateral foraminal narrowing. C4/C5   mild spinal cord   compression.    C6/C7 no evidence of a focal disc herniation with severe bilateral   foraminal narrowing. No evidence of spinal cord compression.          < from: MRI Lumbar Spine w/o Cont (08.17.17 @ 12:13) >  Degenerative changes.  Minimal anterolisthesis of L4 on L5      L5/S1  diffuse disc bulge and osteophyte flattening  compressing the   bilateral L5 foraminal nerve roots.  There is no evidence of spinal canal   stenosis.    L4/L5  diffuse disc bulge and osteophyte flattening the ventral thecal   sac and compressing the bilateral L4 foraminal nerve roots. L4/L5   moderate to severe spinal canal stenosis.    L3/L4  diffuse disc bulge  mildly compressing the bilateral L3 foraminal   nerve roots. L3/L4 mild to moderate spinal canal stenosis.          < from: CT Cervical Spine No Cont (08.17.17 @ 10:02) >  Degenerative changes of the cervical spine most prominent at C3/C4. C3/C4   disc osteophyte complex with severe bilateral foraminal narrowing, left   greater than right. C3/C4 moderate spinal cord compression.    C4/5  disc osteophyte complex with severe bilateral foraminal narrowing.   C4/C5 mild spinal cord compression.    C6/C7 no disc herniation. There is severe left and moderate to severe   right foraminal narrowing .      < end of copied text >

## 2017-08-18 LAB
ANION GAP SERPL CALC-SCNC: 15 MMOL/L — SIGNIFICANT CHANGE UP (ref 5–17)
APPEARANCE UR: CLEAR — SIGNIFICANT CHANGE UP
BILIRUB UR-MCNC: NEGATIVE — SIGNIFICANT CHANGE UP
BLD GP AB SCN SERPL QL: NEGATIVE — SIGNIFICANT CHANGE UP
BUN SERPL-MCNC: 16 MG/DL — SIGNIFICANT CHANGE UP (ref 7–23)
CALCIUM SERPL-MCNC: 9.3 MG/DL — SIGNIFICANT CHANGE UP (ref 8.4–10.5)
CHLORIDE SERPL-SCNC: 104 MMOL/L — SIGNIFICANT CHANGE UP (ref 96–108)
CO2 SERPL-SCNC: 23 MMOL/L — SIGNIFICANT CHANGE UP (ref 22–31)
COLOR SPEC: YELLOW — SIGNIFICANT CHANGE UP
CREAT SERPL-MCNC: 0.7 MG/DL — SIGNIFICANT CHANGE UP (ref 0.5–1.3)
DIFF PNL FLD: NEGATIVE — SIGNIFICANT CHANGE UP
GLUCOSE SERPL-MCNC: 158 MG/DL — HIGH (ref 70–99)
GLUCOSE UR QL: NEGATIVE — SIGNIFICANT CHANGE UP
HCT VFR BLD CALC: 34.8 % — SIGNIFICANT CHANGE UP (ref 34.5–45)
HGB BLD-MCNC: 11.7 G/DL — SIGNIFICANT CHANGE UP (ref 11.5–15.5)
INR BLD: 1.19 — HIGH (ref 0.88–1.16)
KETONES UR-MCNC: NEGATIVE — SIGNIFICANT CHANGE UP
LEUKOCYTE ESTERASE UR-ACNC: (no result)
MCHC RBC-ENTMCNC: 30.4 PG — SIGNIFICANT CHANGE UP (ref 27–34)
MCHC RBC-ENTMCNC: 33.6 G/DL — SIGNIFICANT CHANGE UP (ref 32–36)
MCV RBC AUTO: 90.4 FL — SIGNIFICANT CHANGE UP (ref 80–100)
NITRITE UR-MCNC: NEGATIVE — SIGNIFICANT CHANGE UP
PH UR: 6 — SIGNIFICANT CHANGE UP (ref 5–8)
PLATELET # BLD AUTO: 260 K/UL — SIGNIFICANT CHANGE UP (ref 150–400)
POTASSIUM SERPL-MCNC: 3.8 MMOL/L — SIGNIFICANT CHANGE UP (ref 3.5–5.3)
POTASSIUM SERPL-SCNC: 3.8 MMOL/L — SIGNIFICANT CHANGE UP (ref 3.5–5.3)
PROT UR-MCNC: NEGATIVE MG/DL — SIGNIFICANT CHANGE UP
PROTHROM AB SERPL-ACNC: 13.2 SEC — HIGH (ref 9.8–12.7)
RBC # BLD: 3.85 M/UL — SIGNIFICANT CHANGE UP (ref 3.8–5.2)
RBC # FLD: 13.3 % — SIGNIFICANT CHANGE UP (ref 10.3–16.9)
RH IG SCN BLD-IMP: POSITIVE — SIGNIFICANT CHANGE UP
RH IG SCN BLD-IMP: POSITIVE — SIGNIFICANT CHANGE UP
SODIUM SERPL-SCNC: 142 MMOL/L — SIGNIFICANT CHANGE UP (ref 135–145)
SP GR SPEC: <=1.005 — SIGNIFICANT CHANGE UP (ref 1–1.03)
UROBILINOGEN FLD QL: 0.2 E.U./DL — SIGNIFICANT CHANGE UP
WBC # BLD: 17.6 K/UL — HIGH (ref 3.8–10.5)
WBC # FLD AUTO: 17.6 K/UL — HIGH (ref 3.8–10.5)

## 2017-08-18 PROCEDURE — 99233 SBSQ HOSP IP/OBS HIGH 50: CPT | Mod: GC

## 2017-08-18 PROCEDURE — 93306 TTE W/DOPPLER COMPLETE: CPT | Mod: 26

## 2017-08-18 RX ORDER — HYDROMORPHONE HYDROCHLORIDE 2 MG/ML
0.5 INJECTION INTRAMUSCULAR; INTRAVENOUS; SUBCUTANEOUS
Qty: 0 | Refills: 0 | Status: DISCONTINUED | OUTPATIENT
Start: 2017-08-18 | End: 2017-08-22

## 2017-08-18 RX ORDER — OXYCODONE HYDROCHLORIDE 5 MG/1
5 TABLET ORAL EVERY 4 HOURS
Qty: 0 | Refills: 0 | Status: DISCONTINUED | OUTPATIENT
Start: 2017-08-18 | End: 2017-08-22

## 2017-08-18 RX ORDER — METOCLOPRAMIDE HCL 10 MG
10 TABLET ORAL EVERY 6 HOURS
Qty: 0 | Refills: 0 | Status: DISCONTINUED | OUTPATIENT
Start: 2017-08-18 | End: 2017-08-22

## 2017-08-18 RX ORDER — ACETAMINOPHEN 500 MG
650 TABLET ORAL EVERY 6 HOURS
Qty: 0 | Refills: 0 | Status: DISCONTINUED | OUTPATIENT
Start: 2017-08-18 | End: 2017-08-22

## 2017-08-18 RX ORDER — SODIUM CHLORIDE 9 MG/ML
1000 INJECTION, SOLUTION INTRAVENOUS
Qty: 0 | Refills: 0 | Status: DISCONTINUED | OUTPATIENT
Start: 2017-08-18 | End: 2017-08-22

## 2017-08-18 RX ORDER — ONDANSETRON 8 MG/1
4 TABLET, FILM COATED ORAL EVERY 6 HOURS
Qty: 0 | Refills: 0 | Status: DISCONTINUED | OUTPATIENT
Start: 2017-08-18 | End: 2017-08-22

## 2017-08-18 RX ORDER — CEFAZOLIN SODIUM 1 G
2000 VIAL (EA) INJECTION EVERY 8 HOURS
Qty: 0 | Refills: 0 | Status: COMPLETED | OUTPATIENT
Start: 2017-08-19 | End: 2017-08-19

## 2017-08-18 RX ORDER — OXYCODONE HYDROCHLORIDE 5 MG/1
10 TABLET ORAL EVERY 4 HOURS
Qty: 0 | Refills: 0 | Status: DISCONTINUED | OUTPATIENT
Start: 2017-08-18 | End: 2017-08-22

## 2017-08-18 RX ADMIN — Medication 6 MILLIGRAM(S): at 23:01

## 2017-08-18 RX ADMIN — Medication 6 MILLIGRAM(S): at 05:37

## 2017-08-18 RX ADMIN — Medication 6 MILLIGRAM(S): at 10:25

## 2017-08-18 RX ADMIN — SODIUM CHLORIDE 120 MILLILITER(S): 9 INJECTION, SOLUTION INTRAVENOUS at 10:37

## 2017-08-18 RX ADMIN — Medication: at 23:10

## 2017-08-18 RX ADMIN — HEPARIN SODIUM 5000 UNIT(S): 5000 INJECTION INTRAVENOUS; SUBCUTANEOUS at 05:37

## 2017-08-18 RX ADMIN — AMLODIPINE BESYLATE 10 MILLIGRAM(S): 2.5 TABLET ORAL at 05:37

## 2017-08-18 RX ADMIN — PANTOPRAZOLE SODIUM 40 MILLIGRAM(S): 20 TABLET, DELAYED RELEASE ORAL at 05:37

## 2017-08-18 NOTE — PROGRESS NOTE ADULT - PROBLEM SELECTOR PLAN 3
In ED /86. Medications recorded at pharmacy (haven't been picked up in over a year): atenolol 25mg daily, amlodipine 10mg daily, valsartan 320mg daily, furosemide 20mg daily; only reported to have picked up diclofenac at pharmacy (new script is 25mg)    - started on amlodipine 10mg PO daily
In ED /86. Medications recorded at pharmacy (haven't been picked up in over a year): atenolol 25mg daily, amlodipine 10mg daily, valsartan 320mg daily, furosemide 20mg daily; only reported to have picked up diclofenac at pharmacy (new script is 25mg)    - started on amlodipine 10mg PO daily

## 2017-08-18 NOTE — PROGRESS NOTE ADULT - PROBLEM SELECTOR PLAN 1
Pt sent to ED from neurologist office due to UE weakness and neck cramping. Pt c/o neck pain when lying down and tingling in her b/l UE, particularly index fingers. Also reports tingling and feeling of warmth in b/l LE.  - C-spine MRI: degenerative changes of the cervical spine with spinal cord   compression.  - C - CT spine: Degenerative changes of the cervical spine most prominent at C3/C4, C3/C4 moderate spinal cord compression, C4/C5 mild spinal cord compression.C6/C7 severe left and moderate to severe right foraminal narrowing.    - x-ray C-spine flex/ext ordered to assess stability  - Frequent neuro checks, monitor signs for acute change in exam  - received 1 dose decadron 10mg IV  - on decadron 6mg IV q6h; ISS  - Ortho spine to follow  - neurosurgery consulted o/n; agree with ortho recs, additional imaging and decadron   - following with Dr. Covarrubias, neurologist: [phone] Pt sent to ED from neurologist office due to UE weakness and neck cramping. Pt c/o neck pain when lying down and tingling in her b/l UE, particularly index fingers. Also reports tingling and feeling of warmth in b/l LE.  - C-spine MRI: degenerative changes of the cervical spine with spinal cord   compression.  - C - CT spine: Degenerative changes of the cervical spine most prominent at C3/C4, C3/C4 moderate spinal cord compression, C4/C5 mild spinal cord compression.C6/C7 severe left and moderate to severe right foraminal narrowing.    - x-ray C-spine flex/ext ordered to assess stability  - Frequent neuro checks, monitor signs for acute change in exam  - received 1 dose decadron 10mg IV (leukocytosis secondary to steroids)  - on decadron 6mg IV q6h; ISS  - Ortho spine: cervical spinal fusion surgery by Dr. Martin; Will follow up echocardiogram prior to surgery.   - neurosurgery consulted o/n; agree with ortho recs, additional imaging and decadron   - following with Dr. Covarrubias, neurologist: [phone] Pt sent to ED from neurologist office due to UE weakness and neck cramping. Pt c/o neck pain when lying down and tingling in her b/l UE, particularly index fingers. Also reports tingling and feeling of warmth in b/l LE.  - C-spine MRI: degenerative changes of the cervical spine with spinal cord   compression.  - C - CT spine: Degenerative changes of the cervical spine most prominent at C3/C4, C3/C4 moderate spinal cord compression, C4/C5 mild spinal cord compression.C6/C7 severe left and moderate to severe right foraminal narrowing.    - x-ray C-spine flex/ext: Minimal spondylosis at C5-C6 level, Productive bony changes in the articular processes from C3 to C6.  - Frequent neuro checks, monitor signs for acute change in exam  - received 1 dose decadron 10mg IV (leukocytosis secondary to steroids)  - on decadron 6mg IV q6h; ISS  - Ortho spine: cervical spinal fusion surgery by Dr. Martin; Will follow up echocardiogram prior to surgery.   - neurosurgery consulted o/n; agree with ortho recs, additional imaging and decadron   - following with Dr. Covarrubias, neurologist: [phone]

## 2017-08-18 NOTE — PROGRESS NOTE ADULT - PROBLEM SELECTOR PLAN 2
Patient with long-standing hx of L knee osteoarthritis. 3rd ED visit for knee pain. Has appointment with orthopedist on 8/29/2017. Received steroid injections at [facility]. On home diclofenac 25mg q8h.  - Tylenol 650mg PO q6h PRN moderate pain   - oxycodone 5mg PO q4h PRN for severe pain  - PT consult ordered  - Ortho spine to follow  - C - MRI lumbar spine: degenerative changes with anterolisthesis of L4 on L5, L4/L5   moderate to severe spinal canal stenosis, L3/L4 mild to moderate spinal canal stenosis.  - f/u with Dr. Miller outpatient for OA

## 2017-08-18 NOTE — PROGRESS NOTE ADULT - PROBLEM SELECTOR PLAN 6
Pt c/o constipation; last BM last Saturday; mild abdominal distention on exam  - Started on dulcolax 5mg q12h
Pt c/o constipation; last BM last Saturday; mild abdominal distention on exam  - Started on dulcolax 5mg q12h

## 2017-08-18 NOTE — PROGRESS NOTE ADULT - ASSESSMENT
SUBJECTIVE: Patient seen and examined. ongoing weakness. unable to ambulate independent     OBJECTIVE: A & O x 3. awaiting OR for cervical decompression today    Vital Signs Last 24 Hrs  T(C): 36.2 (18 Aug 2017 21:46), Max: 36.8 (18 Aug 2017 09:12)  T(F): 97.1 (18 Aug 2017 21:46), Max: 98.2 (18 Aug 2017 09:12)  HR: 84 (18 Aug 2017 22:46) (54 - 90)  BP: 153/69 (18 Aug 2017 22:46) (113/69 - 165/72)  BP(mean): 115 (18 Aug 2017 22:31) (96 - 115)  RR: 13 (18 Aug 2017 22:46) (13 - 22)  SpO2: 97% (18 Aug 2017 22:46) (96% - 100%)    Affected extremity:                  Sensation:           Upper extremity                ax        mc           m          u          r                                                         R          +           +             +           +          +                                               L           +           +             +           +          +         Lower extremeity             sp         dp         saph       zuñiga         tibial                                                R          +           +             +           +          +                                               L           +           +             +           +          +         Motor exam:          Upper extremity              Bi(c5)  WE(c6)  EE(c7)   FF(c8)                                                R         5/5        5/5        5/5       5/5                                               L          5/5        5/5        5/5       5/5         Lower extremeity          HF(l2)   KE(l3)    TA(l4)   EHL(l5)  GS(s1)                                                 R        3/5        3/5        3/5       3/5         3/5                                               L         3/5        3/5       3/5       3/5         3/5               4 beats clonus bilateral LE  positive Alfaro Bilateral UE                                            LABS:                        11.7   17.6  )-----------( 260      ( 18 Aug 2017 05:31 )             34.8     08-18    142  |  104  |  16  ----------------------------<  158<H>  3.8   |  23  |  0.70    Ca    9.3      18 Aug 2017 05:31      PT/INR - ( 18 Aug 2017 05:31 )   PT: 13.2 sec;   INR: 1.19          PTT - ( 17 Aug 2017 18:49 )  PTT:36.3 sec  Urinalysis Basic - ( 18 Aug 2017 06:58 )    Color: Yellow / Appearance: Clear / SG: <=1.005 / pH: x  Gluc: x / Ketone: NEGATIVE  / Bili: NEGATIVE / Urobili: 0.2 E.U./dL   Blood: x / Protein: NEGATIVE mg/dL / Nitrite: NEGATIVE   Leuk Esterase: Trace / RBC: x / WBC < 5 /HPF   Sq Epi: x / Non Sq Epi: Few /HPF / Bacteria: Present /HPF        A/P :  66y Female   spinal cord syndrome with cord signal changes on MRI.  acute progressive neurological decline over the last 5 weeks.   seen in ED several visits over the past month for the weakness. First MRI obtained this week on admission.  seen by neurologist this week and was told to admitted through ED for progressive weakness  severe compression cervical spinal cord with LE proximal weakness. unable to write with right UE. RHD  unable to transfer alone.   unable to stand from sitting position.  unable to ambulate with out assistance from one or two people and walker  several falls reported secondary to weakness  risks of procedure discussed  prognosis discussed in detail given the time and progression of weakness and severity  understands key objective surgery is to arrest the neurological progression and unclear as to degree and timing of UE and LE motor function and ambulation status

## 2017-08-18 NOTE — PROGRESS NOTE ADULT - SUBJECTIVE AND OBJECTIVE BOX
Orthopaedics Post Op Check    Procedure: ACDF C3-C5  Surgeon: Veronica    Pt comfortable, without complaints  Denies CP, SOB, N/V, numbness/tingling     Vital Signs Last 24 Hrs  T(C): 36.2 (18 Aug 2017 21:46), Max: 36.8 (18 Aug 2017 09:12)  T(F): 97.1 (18 Aug 2017 21:46), Max: 98.2 (18 Aug 2017 09:12)  HR: 84 (18 Aug 2017 22:46) (54 - 90)  BP: 153/69 (18 Aug 2017 22:46) (113/69 - 165/72)  BP(mean): 115 (18 Aug 2017 22:31) (96 - 115)  RR: 13 (18 Aug 2017 22:46) (13 - 22)  SpO2: 97% (18 Aug 2017 22:46) (96% - 100%)  AVSS, NAD    Dressing C/D/I  General: Pt Alert and oriented   wwp  Sensation: SILT upper and lower  Motor: 5/5 HG/BI/TRI/DELT b/l and 5/5 FHL/GS, 4/5 TA/EHL b/l   + Alfaro b/l , + Clonus 4-5 beats b/l                           11.7   17.6  )-----------( 260      ( 18 Aug 2017 05:31 )             34.8   08-18    142  |  104  |  16  ----------------------------<  158<H>  3.8   |  23  |  0.70    Ca    9.3      18 Aug 2017 05:31      A/P: 66yFemale POD#0 s/p above  - Dr Martin present for exam  - Stable  - Pain Control  - DVT ppx: scd  - Post op abx: ancef  - PT, WBS: wbat  - F/U AM Labs

## 2017-08-18 NOTE — PROGRESS NOTE ADULT - PROBLEM SELECTOR PLAN 4
In ED HR 54, pt asymptomatic. This AM HR 56.  - On home atenolol 25mg daily but patient hasn't filled her medications for the past year  - Hold atenolol  - f/u with PCP Dr. Hills  for collateral on home meds

## 2017-08-18 NOTE — PROGRESS NOTE ADULT - SUBJECTIVE AND OBJECTIVE BOX
SUBJECTIVE/OVERNIGHT EVENTS: pt seen and examined at bedside. C/o neck pain particularly when lying down. Patient slept in chair until 5am to relieve the pain. Also c/o upper + lower extremity tingling and lower extremity warmth. Denies urinary/bowel incontinence but reports constipation (last BM Saturday).     OBJECTIVES:    VITAL SIGNS:  T(F): 98.2 (08-18-17 @ 09:12)  HR: 57 (08-18-17 @ 09:12)  BP: 129/71 (08-18-17 @ 09:12)  RR: 18 (08-18-17 @ 09:12)  SpO2: 100% (08-18-17 @ 09:12)    LABS:                        11.7   17.6  )-----------( 260      ( 18 Aug 2017 05:31 )             34.8     08-18    142  |  104  |  16  ----------------------------<  158<H>  3.8   |  23  |  0.70    Ca    9.3      18 Aug 2017 05:31    TPro  7.6  /  Alb  3.9  /  TBili  0.4  /  DBili  x   /  AST  12  /  ALT  14  /  AlkPhos  64  08-16    PT/INR - ( 18 Aug 2017 05:31 )   PT: 13.2 sec;   INR: 1.19          PTT - ( 17 Aug 2017 18:49 )  PTT:36.3 sec  Urinalysis Basic - ( 18 Aug 2017 06:58 )    Color: Yellow / Appearance: Clear / SG: <=1.005 / pH: x  Gluc: x / Ketone: NEGATIVE  / Bili: NEGATIVE / Urobili: 0.2 E.U./dL   Blood: x / Protein: NEGATIVE mg/dL / Nitrite: NEGATIVE   Leuk Esterase: Trace / RBC: x / WBC < 5 /HPF   Sq Epi: x / Non Sq Epi: Few /HPF / Bacteria: Present /HPF

## 2017-08-18 NOTE — PROGRESS NOTE ADULT - ASSESSMENT
66F with PMH of HTN, HLD, gastric ulcer, L knee osteoarthritis was sent to [facility] ED by neurologist for UE weakness and neck cramping. Also c/o L knee weakness and inability to ambulate. Found to have LLE weakness on exam and tingling/number in all 4 extremities. C-spine MRI showing evidence of cervical spinal cord compression. Admitted for work-up for cervical spine compression.

## 2017-08-18 NOTE — PROGRESS NOTE ADULT - PROBLEM SELECTOR PLAN 5
On Zetia 10mg daily and atorvastatin 20mg daily; patient hasn't filled her medications for the past year  - c/w atorvastatin 20mg PO QHS
On Zetia 10mg daily and atorvastatin 20mg daily; patient hasn't filled her medications for the past year  - c/w atorvastatin 20mg PO QHS

## 2017-08-18 NOTE — PROGRESS NOTE ADULT - PROBLEM SELECTOR PLAN 8
DVT ppx: SubQ heparin 5000U Q8h    FULL CODE DVT ppx: SubQ heparin 5000U Q8h    Dispo: transfer to ortho    FULL CODE

## 2017-08-19 LAB
ANION GAP SERPL CALC-SCNC: 16 MMOL/L — SIGNIFICANT CHANGE UP (ref 5–17)
BASOPHILS NFR BLD AUTO: 0.1 % — SIGNIFICANT CHANGE UP (ref 0–2)
BUN SERPL-MCNC: 13 MG/DL — SIGNIFICANT CHANGE UP (ref 7–23)
CALCIUM SERPL-MCNC: 8.8 MG/DL — SIGNIFICANT CHANGE UP (ref 8.4–10.5)
CHLORIDE SERPL-SCNC: 100 MMOL/L — SIGNIFICANT CHANGE UP (ref 96–108)
CO2 SERPL-SCNC: 22 MMOL/L — SIGNIFICANT CHANGE UP (ref 22–31)
CREAT SERPL-MCNC: 0.7 MG/DL — SIGNIFICANT CHANGE UP (ref 0.5–1.3)
EOSINOPHIL NFR BLD AUTO: 0 % — SIGNIFICANT CHANGE UP (ref 0–6)
GLUCOSE SERPL-MCNC: 135 MG/DL — HIGH (ref 70–99)
HCT VFR BLD CALC: 33.4 % — LOW (ref 34.5–45)
HGB BLD-MCNC: 11.5 G/DL — SIGNIFICANT CHANGE UP (ref 11.5–15.5)
LYMPHOCYTES # BLD AUTO: 8.7 % — LOW (ref 13–44)
MCHC RBC-ENTMCNC: 30.8 PG — SIGNIFICANT CHANGE UP (ref 27–34)
MCHC RBC-ENTMCNC: 34.4 G/DL — SIGNIFICANT CHANGE UP (ref 32–36)
MCV RBC AUTO: 89.5 FL — SIGNIFICANT CHANGE UP (ref 80–100)
MONOCYTES NFR BLD AUTO: 5.4 % — SIGNIFICANT CHANGE UP (ref 2–14)
NEUTROPHILS NFR BLD AUTO: 85.8 % — HIGH (ref 43–77)
PLATELET # BLD AUTO: 205 K/UL — SIGNIFICANT CHANGE UP (ref 150–400)
POTASSIUM SERPL-MCNC: 4.9 MMOL/L — SIGNIFICANT CHANGE UP (ref 3.5–5.3)
POTASSIUM SERPL-SCNC: 4.9 MMOL/L — SIGNIFICANT CHANGE UP (ref 3.5–5.3)
RBC # BLD: 3.73 M/UL — LOW (ref 3.8–5.2)
RBC # FLD: 13.4 % — SIGNIFICANT CHANGE UP (ref 10.3–16.9)
SODIUM SERPL-SCNC: 138 MMOL/L — SIGNIFICANT CHANGE UP (ref 135–145)
WBC # BLD: 20.5 K/UL — HIGH (ref 3.8–10.5)
WBC # FLD AUTO: 20.5 K/UL — HIGH (ref 3.8–10.5)

## 2017-08-19 RX ORDER — BENZOCAINE AND MENTHOL 5; 1 G/100ML; G/100ML
1 LIQUID ORAL
Qty: 0 | Refills: 0 | Status: DISCONTINUED | OUTPATIENT
Start: 2017-08-19 | End: 2017-08-22

## 2017-08-19 RX ADMIN — Medication 6 MILLIGRAM(S): at 04:03

## 2017-08-19 RX ADMIN — BENZOCAINE AND MENTHOL 1 LOZENGE: 5; 1 LIQUID ORAL at 06:50

## 2017-08-19 RX ADMIN — ATORVASTATIN CALCIUM 20 MILLIGRAM(S): 80 TABLET, FILM COATED ORAL at 01:19

## 2017-08-19 RX ADMIN — OXYCODONE HYDROCHLORIDE 10 MILLIGRAM(S): 5 TABLET ORAL at 03:40

## 2017-08-19 RX ADMIN — Medication 2: at 22:24

## 2017-08-19 RX ADMIN — Medication 100 MILLIGRAM(S): at 12:03

## 2017-08-19 RX ADMIN — Medication 6 MILLIGRAM(S): at 21:41

## 2017-08-19 RX ADMIN — Medication 100 MILLIGRAM(S): at 01:19

## 2017-08-19 RX ADMIN — Medication 650 MILLIGRAM(S): at 15:14

## 2017-08-19 RX ADMIN — ATORVASTATIN CALCIUM 20 MILLIGRAM(S): 80 TABLET, FILM COATED ORAL at 21:41

## 2017-08-19 RX ADMIN — OXYCODONE HYDROCHLORIDE 10 MILLIGRAM(S): 5 TABLET ORAL at 02:40

## 2017-08-19 RX ADMIN — PANTOPRAZOLE SODIUM 40 MILLIGRAM(S): 20 TABLET, DELAYED RELEASE ORAL at 04:03

## 2017-08-19 RX ADMIN — OXYCODONE HYDROCHLORIDE 10 MILLIGRAM(S): 5 TABLET ORAL at 06:51

## 2017-08-19 RX ADMIN — SODIUM CHLORIDE 70 MILLILITER(S): 9 INJECTION, SOLUTION INTRAVENOUS at 08:55

## 2017-08-19 RX ADMIN — Medication 6 MILLIGRAM(S): at 16:30

## 2017-08-19 RX ADMIN — Medication 650 MILLIGRAM(S): at 02:19

## 2017-08-19 RX ADMIN — OXYCODONE HYDROCHLORIDE 10 MILLIGRAM(S): 5 TABLET ORAL at 07:45

## 2017-08-19 RX ADMIN — AMLODIPINE BESYLATE 10 MILLIGRAM(S): 2.5 TABLET ORAL at 04:03

## 2017-08-19 RX ADMIN — Medication 650 MILLIGRAM(S): at 16:00

## 2017-08-19 RX ADMIN — Medication 650 MILLIGRAM(S): at 01:19

## 2017-08-19 RX ADMIN — Medication 5 MILLIGRAM(S): at 01:19

## 2017-08-19 RX ADMIN — Medication 6 MILLIGRAM(S): at 11:04

## 2017-08-19 NOTE — PROGRESS NOTE ADULT - SUBJECTIVE AND OBJECTIVE BOX
Did well o/n.     Procedure: ACDF C3-C5  Surgeon: Veronica    Pt comfortable, without complaints  Denies CP, SOB, N/V, numbness/tingling     Vital Signs Last 24 Hrs  T(C): 36.2 (18 Aug 2017 21:46), Max: 36.8 (18 Aug 2017 09:12)  T(F): 97.1 (18 Aug 2017 21:46), Max: 98.2 (18 Aug 2017 09:12)  HR: 84 (18 Aug 2017 22:46) (54 - 90)  BP: 153/69 (18 Aug 2017 22:46) (113/69 - 165/72)  BP(mean): 115 (18 Aug 2017 22:31) (96 - 115)  RR: 13 (18 Aug 2017 22:46) (13 - 22)  SpO2: 97% (18 Aug 2017 22:46) (96% - 100%)  AVSS, NAD    Dressing C/D/I  General: Pt Alert and oriented   wwp  Sensation: SILT upper and lower  Motor: 5/5 HG/BI/TRI/DELT b/l and 5/5 FHL/GS, 4/5 TA/EHL b/l   + Alfaro b/l , + Clonus 4-5 beats b/l       A/P: 66yFemale POD#1 s/p above  - Dr Martin present for exam  - Stable  - Pain Control  - DVT ppx: scd  - Post op abx: ancef  - PT, WBS: wbat  - F/U AM Labs

## 2017-08-20 LAB
ANION GAP SERPL CALC-SCNC: 10 MMOL/L — SIGNIFICANT CHANGE UP (ref 5–17)
BASOPHILS NFR BLD AUTO: 0.1 % — SIGNIFICANT CHANGE UP (ref 0–2)
BUN SERPL-MCNC: 15 MG/DL — SIGNIFICANT CHANGE UP (ref 7–23)
CALCIUM SERPL-MCNC: 8.6 MG/DL — SIGNIFICANT CHANGE UP (ref 8.4–10.5)
CHLORIDE SERPL-SCNC: 102 MMOL/L — SIGNIFICANT CHANGE UP (ref 96–108)
CO2 SERPL-SCNC: 28 MMOL/L — SIGNIFICANT CHANGE UP (ref 22–31)
CREAT SERPL-MCNC: 0.7 MG/DL — SIGNIFICANT CHANGE UP (ref 0.5–1.3)
GLUCOSE SERPL-MCNC: 144 MG/DL — HIGH (ref 70–99)
HCT VFR BLD CALC: 33.2 % — LOW (ref 34.5–45)
HGB BLD-MCNC: 11.1 G/DL — LOW (ref 11.5–15.5)
LYMPHOCYTES # BLD AUTO: 11.4 % — LOW (ref 13–44)
MCHC RBC-ENTMCNC: 30.7 PG — SIGNIFICANT CHANGE UP (ref 27–34)
MCHC RBC-ENTMCNC: 33.4 G/DL — SIGNIFICANT CHANGE UP (ref 32–36)
MCV RBC AUTO: 91.7 FL — SIGNIFICANT CHANGE UP (ref 80–100)
MONOCYTES NFR BLD AUTO: 5.5 % — SIGNIFICANT CHANGE UP (ref 2–14)
NEUTROPHILS NFR BLD AUTO: 83 % — HIGH (ref 43–77)
PLATELET # BLD AUTO: 265 K/UL — SIGNIFICANT CHANGE UP (ref 150–400)
POTASSIUM SERPL-MCNC: 4.2 MMOL/L — SIGNIFICANT CHANGE UP (ref 3.5–5.3)
POTASSIUM SERPL-SCNC: 4.2 MMOL/L — SIGNIFICANT CHANGE UP (ref 3.5–5.3)
RBC # BLD: 3.62 M/UL — LOW (ref 3.8–5.2)
RBC # FLD: 12.6 % — SIGNIFICANT CHANGE UP (ref 10.3–16.9)
SODIUM SERPL-SCNC: 140 MMOL/L — SIGNIFICANT CHANGE UP (ref 135–145)
WBC # BLD: 17.5 K/UL — HIGH (ref 3.8–10.5)
WBC # FLD AUTO: 17.5 K/UL — HIGH (ref 3.8–10.5)

## 2017-08-20 RX ADMIN — BENZOCAINE AND MENTHOL 1 LOZENGE: 5; 1 LIQUID ORAL at 22:55

## 2017-08-20 RX ADMIN — SODIUM CHLORIDE 70 MILLILITER(S): 9 INJECTION, SOLUTION INTRAVENOUS at 06:52

## 2017-08-20 RX ADMIN — Medication 6 MILLIGRAM(S): at 17:12

## 2017-08-20 RX ADMIN — OXYCODONE HYDROCHLORIDE 10 MILLIGRAM(S): 5 TABLET ORAL at 10:46

## 2017-08-20 RX ADMIN — PANTOPRAZOLE SODIUM 40 MILLIGRAM(S): 20 TABLET, DELAYED RELEASE ORAL at 06:48

## 2017-08-20 RX ADMIN — BENZOCAINE AND MENTHOL 1 LOZENGE: 5; 1 LIQUID ORAL at 06:50

## 2017-08-20 RX ADMIN — SODIUM CHLORIDE 70 MILLILITER(S): 9 INJECTION, SOLUTION INTRAVENOUS at 21:30

## 2017-08-20 RX ADMIN — Medication 6 MILLIGRAM(S): at 10:45

## 2017-08-20 RX ADMIN — Medication 10 MILLIGRAM(S): at 22:45

## 2017-08-20 RX ADMIN — OXYCODONE HYDROCHLORIDE 10 MILLIGRAM(S): 5 TABLET ORAL at 11:30

## 2017-08-20 RX ADMIN — OXYCODONE HYDROCHLORIDE 5 MILLIGRAM(S): 5 TABLET ORAL at 01:32

## 2017-08-20 RX ADMIN — ATORVASTATIN CALCIUM 20 MILLIGRAM(S): 80 TABLET, FILM COATED ORAL at 21:33

## 2017-08-20 RX ADMIN — AMLODIPINE BESYLATE 10 MILLIGRAM(S): 2.5 TABLET ORAL at 06:48

## 2017-08-20 RX ADMIN — OXYCODONE HYDROCHLORIDE 5 MILLIGRAM(S): 5 TABLET ORAL at 02:32

## 2017-08-20 RX ADMIN — Medication 6 MILLIGRAM(S): at 06:48

## 2017-08-20 RX ADMIN — Medication 6 MILLIGRAM(S): at 21:32

## 2017-08-20 RX ADMIN — Medication 5 MILLIGRAM(S): at 06:48

## 2017-08-20 NOTE — PROGRESS NOTE ADULT - SUBJECTIVE AND OBJECTIVE BOX
Did well o/n.     Procedure: ACDF C3-C5  Surgeon: Veronica    Pt comfortable, without complaints  Denies CP, SOB, N/V, numbness/tingling     Vital Signs Last 24 Hrs  T(C): 36.3 (20 Aug 2017 06:39), Max: 37.1 (19 Aug 2017 20:54)  T(F): 97.3 (20 Aug 2017 06:39), Max: 98.7 (19 Aug 2017 20:54)  HR: 84 (20 Aug 2017 06:39) (50 - 84)  BP: 128/63 (20 Aug 2017 06:39) (128/63 - 159/75)  BP(mean): --  RR: 16 (20 Aug 2017 06:39) (16 - 17)  SpO2: 93% (20 Aug 2017 06:39) (93% - 97%)    Dressing C/D/I  General: Pt Alert and oriented   wwp  Sensation: SILT upper and lower  Motor: 5/5 HG/BI/TRI/DELT b/l and 5/5 FHL/GS, 4/5 TA/EHL b/l       A/P: 66yFemale POD#2 s/p above  - Dr Martin present for exam  - Stable  - Pain Control  - DVT ppx: scd  - Post op abx: ancef  - PT, WBS: wbat  - F/U AM Labs

## 2017-08-21 ENCOUNTER — TRANSCRIPTION ENCOUNTER (OUTPATIENT)
Age: 67
End: 2017-08-21

## 2017-08-21 LAB
ANION GAP SERPL CALC-SCNC: 11 MMOL/L — SIGNIFICANT CHANGE UP (ref 5–17)
BASOPHILS NFR BLD AUTO: 0.1 % — SIGNIFICANT CHANGE UP (ref 0–2)
BUN SERPL-MCNC: 16 MG/DL — SIGNIFICANT CHANGE UP (ref 7–23)
CALCIUM SERPL-MCNC: 8.9 MG/DL — SIGNIFICANT CHANGE UP (ref 8.4–10.5)
CHLORIDE SERPL-SCNC: 101 MMOL/L — SIGNIFICANT CHANGE UP (ref 96–108)
CO2 SERPL-SCNC: 27 MMOL/L — SIGNIFICANT CHANGE UP (ref 22–31)
CREAT SERPL-MCNC: 0.7 MG/DL — SIGNIFICANT CHANGE UP (ref 0.5–1.3)
GLUCOSE SERPL-MCNC: 138 MG/DL — HIGH (ref 70–99)
HCT VFR BLD CALC: 35.2 % — SIGNIFICANT CHANGE UP (ref 34.5–45)
HGB BLD-MCNC: 11.9 G/DL — SIGNIFICANT CHANGE UP (ref 11.5–15.5)
LYMPHOCYTES # BLD AUTO: 14 % — SIGNIFICANT CHANGE UP (ref 13–44)
MCHC RBC-ENTMCNC: 30.7 PG — SIGNIFICANT CHANGE UP (ref 27–34)
MCHC RBC-ENTMCNC: 33.8 G/DL — SIGNIFICANT CHANGE UP (ref 32–36)
MCV RBC AUTO: 90.7 FL — SIGNIFICANT CHANGE UP (ref 80–100)
MONOCYTES NFR BLD AUTO: 5.5 % — SIGNIFICANT CHANGE UP (ref 2–14)
NEUTROPHILS NFR BLD AUTO: 80.4 % — HIGH (ref 43–77)
PLATELET # BLD AUTO: 286 K/UL — SIGNIFICANT CHANGE UP (ref 150–400)
POTASSIUM SERPL-MCNC: 4.2 MMOL/L — SIGNIFICANT CHANGE UP (ref 3.5–5.3)
POTASSIUM SERPL-SCNC: 4.2 MMOL/L — SIGNIFICANT CHANGE UP (ref 3.5–5.3)
RBC # BLD: 3.88 M/UL — SIGNIFICANT CHANGE UP (ref 3.8–5.2)
RBC # FLD: 12.3 % — SIGNIFICANT CHANGE UP (ref 10.3–16.9)
SODIUM SERPL-SCNC: 139 MMOL/L — SIGNIFICANT CHANGE UP (ref 135–145)
WBC # BLD: 16 K/UL — HIGH (ref 3.8–10.5)
WBC # FLD AUTO: 16 K/UL — HIGH (ref 3.8–10.5)

## 2017-08-21 RX ADMIN — OXYCODONE HYDROCHLORIDE 10 MILLIGRAM(S): 5 TABLET ORAL at 04:51

## 2017-08-21 RX ADMIN — Medication 6 MILLIGRAM(S): at 22:03

## 2017-08-21 RX ADMIN — Medication 6 MILLIGRAM(S): at 17:17

## 2017-08-21 RX ADMIN — OXYCODONE HYDROCHLORIDE 5 MILLIGRAM(S): 5 TABLET ORAL at 23:00

## 2017-08-21 RX ADMIN — ATORVASTATIN CALCIUM 20 MILLIGRAM(S): 80 TABLET, FILM COATED ORAL at 21:59

## 2017-08-21 RX ADMIN — Medication 6 MILLIGRAM(S): at 04:50

## 2017-08-21 RX ADMIN — PANTOPRAZOLE SODIUM 40 MILLIGRAM(S): 20 TABLET, DELAYED RELEASE ORAL at 06:05

## 2017-08-21 RX ADMIN — OXYCODONE HYDROCHLORIDE 5 MILLIGRAM(S): 5 TABLET ORAL at 22:07

## 2017-08-21 RX ADMIN — Medication 30 MILLILITER(S): at 18:10

## 2017-08-21 RX ADMIN — Medication 6 MILLIGRAM(S): at 09:45

## 2017-08-21 RX ADMIN — AMLODIPINE BESYLATE 10 MILLIGRAM(S): 2.5 TABLET ORAL at 05:54

## 2017-08-21 RX ADMIN — OXYCODONE HYDROCHLORIDE 10 MILLIGRAM(S): 5 TABLET ORAL at 05:51

## 2017-08-21 NOTE — DISCHARGE NOTE ADULT - MEDICATION SUMMARY - MEDICATIONS TO TAKE
I will START or STAY ON the medications listed below when I get home from the hospital:    Percocet 5/325 325 mg-5 mg oral tablet  -- 1 - 2 tab(s) by mouth every 4 - 6 hours, As Needed -for moderate pain MDD:12  -- Caution federal law prohibits the transfer of this drug to any person other  than the person for whom it was prescribed.  May cause drowsiness.  Alcohol may intensify this effect.  Use care when operating dangerous machinery.  This prescription cannot be refilled.  This product contains acetaminophen.  Do not use  with any other product containing acetaminophen to prevent possible liver damage.  Using more of this medication than prescribed may cause serious breathing problems.    -- Indication: For Pain    atorvastatin 20 mg oral tablet  -- 1 tab(s) by mouth once a day  -- Indication: For Home med    atenolol 25 mg oral tablet  -- 1 tab(s) by mouth once a day  -- Indication: For Home med    amLODIPine 10 mg oral tablet  -- 1 tab(s) by mouth once a day  -- Indication: For Home med

## 2017-08-21 NOTE — PROGRESS NOTE ADULT - SUBJECTIVE AND OBJECTIVE BOX
Did well o/n.     Procedure: ACDF C3-C5  Surgeon: Veronica    Pt comfortable, without complaints  Denies CP, SOB, N/V, numbness/tingling     Vital Signs Last 24 Hrs  T(C): 36.6 (21 Aug 2017 05:03), Max: 37.2 (20 Aug 2017 20:52)  T(F): 97.8 (21 Aug 2017 05:03), Max: 98.9 (20 Aug 2017 20:52)  HR: 46 (21 Aug 2017 05:03) (46 - 84)  BP: 150/70 (21 Aug 2017 05:03) (128/63 - 157/72)  BP(mean): --  RR: 16 (21 Aug 2017 05:03) (15 - 17)  SpO2: 95% (21 Aug 2017 05:03) (93% - 98%)    Dressing C/D/I  General: Pt Alert and oriented   wwp  Sensation: SILT upper and lower  Motor: 5/5 HG/BI/TRI/DELT b/l and 5/5 FHL/GS, 4/5 TA/EHL b/l       A/P: 66yFemale POD#3 s/p above  - Dr Martin present for exam  - Stable  - Pain Control  - DVT ppx: scd  - Post op abx: ancef  - PT, WBS: wbat  - F/U AM Labs

## 2017-08-21 NOTE — DISCHARGE NOTE ADULT - MEDICATION SUMMARY - MEDICATIONS TO STOP TAKING
I will STOP taking the medications listed below when I get home from the hospital:    diclofenac potassium 50 mg oral tablet  -- 1 tab(s) by mouth every 8 hours  -- Do not take this drug if you are pregnant.  It is very important that you take or use this exactly as directed.  Do not skip doses or discontinue unless directed by your doctor.  May cause drowsiness or dizziness.  Obtain medical advice before taking any non-prescription drugs as some may affect the action of this medication.  Take with food or milk.

## 2017-08-21 NOTE — DISCHARGE NOTE ADULT - CARE PLAN
Principal Discharge DX:	Cervical spinal cord compression  Goal:	Improvement after surgery  Instructions for follow-up, activity and diet:	See below

## 2017-08-21 NOTE — DISCHARGE NOTE ADULT - HOSPITAL COURSE
ED Admission  Surgery - ACDF C3-C5  Yaneli-op Antibiotics  Pain control  DVT prophylaxis  OOB/Physical Therapy

## 2017-08-21 NOTE — DIETITIAN INITIAL EVALUATION ADULT. - PROBLEM SELECTOR PLAN 1
Patient with long-standing hx of L knee osteoarthritis. 3rd ED visit for knee pain. Has appointment with orthopedist on 8/29/2017. Received steroid injections at [facility]   - home diclofenac 25mg q8h; holding due to no pain on exam   - Tylenol 650mg q6h PRN moderate pain   - PT consult ordered  - f/u Ortho recs, lumbar xray results

## 2017-08-21 NOTE — DIETITIAN INITIAL EVALUATION ADULT. - ENERGY NEEDS
Height: 5'6" Weight: 234lbs, IBW 130lbs+/-10%, %%, BMI 37.8  IBW used for calculations as pt >120% of IBW

## 2017-08-21 NOTE — DIETITIAN INITIAL EVALUATION ADULT. - OTHER INFO
65yo F POD#3 s/p ACDF C3-C5. On DASH, low fat diet and tolerating PO. Currently has poor appetite, states she had bad rxn to meal last night 8/20. Denies V/D, previous constipation- has resolved after BM yesterday. Passing flatus. Pt reports continual nausea from last night. Menu was provided for pt to see selection. Had 0% of breakfast this AM. Explained purpose of current diet order. NKFA or dietary restrictions. Skin intact except for surgical incision, GI WDL except constipation per flowsheet.

## 2017-08-21 NOTE — DISCHARGE NOTE ADULT - CARE PROVIDER_API CALL
Martin Martin), Orthopaedic Surgery  [address], [place] [address]  Phone: [phone]  Fax: [phone]

## 2017-08-21 NOTE — DISCHARGE NOTE ADULT - PATIENT PORTAL LINK FT
“You can access the [employer] Patient Portal, offered by [facility], by registering with the following website: [email or web address]”

## 2017-08-21 NOTE — DISCHARGE NOTE ADULT - ADDITIONAL INSTRUCTIONS
No strenuous activity (bending/twisting), heavy lifting, driving or returning to work until cleared by MD.  Change dressing daily with gauze/tape till post-op day #5, then leave incision open to air.  You may shower post-op day#5, keep incision clean and dry.   Try to have regular bowel movements, take stool softener or laxative if necessary.  May take pepcid or zantac for upset stomach.  May apply ice to affected areas to decrease swelling.  Call to schedule an appt with Dr. Martin for follow up, if you have staples or sutures they will be removed in office.  Contact your doctor if you experience: fever greater than 101.5, chills, chest pain, difficulty breathing, redness or excessive drainage around the incision, other concerns.     Please follow up with your primary care provider.

## 2017-08-21 NOTE — DISCHARGE NOTE ADULT - HOME CARE AGENCY
[facility] [phone] with Start of Care request 08/23/17

## 2017-08-21 NOTE — DIETITIAN INITIAL EVALUATION ADULT. - PROBLEM SELECTOR PLAN 3
In ED /86. Medications recorded at pharmacy (haven't been picked up in over a year): atenolol 25mg daily, amlodipine 10mg daily, valsartan 320mg daily, furosemide 20mg daily; only reported to have picked up diclofenac at pharmacy (new script is 25mg)    - started on amlodipine 10mg PO daily

## 2017-08-21 NOTE — PROGRESS NOTE ADULT - SUBJECTIVE AND OBJECTIVE BOX
POST OPERATIVE DAY #: 3  STATUS POST: ACDF C3-5                        SUBJECTIVE: Patient seen and examined.     Pain:  well controlled      OBJECTIVE:     Vital Signs Last 24 Hrs  T(C): 36 (21 Aug 2017 09:12), Max: 37.2 (20 Aug 2017 20:52)  T(F): 96.8 (21 Aug 2017 09:12), Max: 98.9 (20 Aug 2017 20:52)  HR: 53 (21 Aug 2017 09:12) (46 - 56)  BP: 143/67 (21 Aug 2017 09:12) (139/63 - 157/72)  BP(mean): --  RR: 15 (21 Aug 2017 09:12) (15 - 16)  SpO2: 93% (21 Aug 2017 09:12) (93% - 98%)    PE:         Dressing: clean/dry/intact   b/l UE:         Sensation: intact to light touch to patient's baseline         warm, well-perfused              I&O's Detail    20 Aug 2017 07:01  -  21 Aug 2017 07:00  --------------------------------------------------------  IN:    lactated ringers.: 1680 mL    Oral Fluid: 1680 mL  Total IN: 3360 mL    OUT:    Voided: 2400 mL  Total OUT: 2400 mL    Total NET: 960 mL      21 Aug 2017 07:01  -  21 Aug 2017 13:54  --------------------------------------------------------  IN:  Total IN: 0 mL    OUT:    Voided: 600 mL  Total OUT: 600 mL    Total NET: -600 mL          LABS:                        11.9   16.0  )-----------( 286      ( 21 Aug 2017 06:39 )             35.2     08-21    139  |  101  |  16  ----------------------------<  138<H>  4.2   |  27  |  0.70    Ca    8.9      21 Aug 2017 06:37            MEDICATIONS:    diclofenac 25 milliGRAM(s) Oral every 8 hours PRN  metoclopramide Injectable 10 milliGRAM(s) IV Push every 6 hours PRN  ondansetron Injectable 4 milliGRAM(s) IV Push every 6 hours PRN  oxyCODONE    IR 5 milliGRAM(s) Oral every 4 hours PRN  oxyCODONE    IR 10 milliGRAM(s) Oral every 4 hours PRN  HYDROmorphone  Injectable 0.5 milliGRAM(s) IV Push every 3 hours PRN  HYDROmorphone  Injectable 0.5 milliGRAM(s) IV Push every 10 minutes PRN  acetaminophen   Tablet 650 milliGRAM(s) Oral every 6 hours PRN  acetaminophen   Tablet. 650 milliGRAM(s) Oral every 6 hours PRN        RADIOLOGY & ADDITIONAL STUDIES:    ASSESSMENT AND PLAN:     1. Analgesic pain control  2. DVT prophylaxis:  SCDs    3. Weight Bearing Status:  Weight bearing as tolerated  4. Disposition:  Subacute Rehab

## 2017-08-21 NOTE — PROGRESS NOTE ADULT - SUBJECTIVE AND OBJECTIVE BOX
HPI:  67yo F with PMH of HTN, HLD, gastric ulcer, L knee osteoarthritis presented to [facility] ED with L knee pain and inability to ambulate found to have cervical cord compression s/p surgical intervention. POD #3, doing well.   Ambulated with PT today.     Last night very nauseous after eating fish. Today has been limiting her intake to bananas.   +Shoulder pain bilateral  Patient reports increased strength of her Lower extremities.     In ED:   Vital Signs  T(C): 37.1 (16 Aug 2017 18:52), Max: 37.1 (16 Aug 2017 18:52)  T(F): 98.7 (16 Aug 2017 18:52), Max: 98.7 (16 Aug 2017 18:52)  HR: 54 (16 Aug 2017 18:52) (54 - 54)  BP: 142/84 (16 Aug 2017 18:52) (142/84 - 176/86)  RR: 16 (16 Aug 2017 18:52) (16 - 16)  SpO2: 98% (16 Aug 2017 18:52) (98% - 99%)  C-spine MRI ordered.   Admitted to [facility] regional medical floor for PT eval, rehab. (16 Aug 2017 18:52)    PAST MEDICAL & SURGICAL HISTORY:  Osteoarthritis of knee, unilateral: left knee  Gastric ulcer  HLD (hyperlipidemia)  HTN (hypertension)  H/O excision of mass: plantar surface of left foot  S/P ovarian cystectomy      MEDICATIONS  (STANDING):  amLODIPine   Tablet 10 milliGRAM(s) Oral daily  atorvastatin 20 milliGRAM(s) Oral at bedtime  insulin lispro (HumaLOG) corrective regimen sliding scale   SubCutaneous Before meals and at bedtime  dextrose 5%. 1000 milliLiter(s) (50 mL/Hr) IV Continuous <Continuous>  dextrose 50% Injectable 12.5 Gram(s) IV Push once  dextrose 50% Injectable 25 Gram(s) IV Push once  dextrose 50% Injectable 25 Gram(s) IV Push once  dexamethasone  Injectable 6 milliGRAM(s) IV Push every 6 hours  pantoprazole    Tablet 40 milliGRAM(s) Oral before breakfast  lactated ringers. 1000 milliLiter(s) (120 mL/Hr) IV Continuous <Continuous>  lactated ringers. 1000 milliLiter(s) (70 mL/Hr) IV Continuous <Continuous>    MEDICATIONS  (PRN):  dextrose Gel 1 Dose(s) Oral once PRN Blood Glucose LESS THAN 70 milliGRAM(s)/deciliter  glucagon  Injectable 1 milliGRAM(s) IntraMuscular once PRN Glucose LESS THAN 70 milligrams/deciliter  bisacodyl 5 milliGRAM(s) Oral every 12 hours PRN Constipation  diclofenac 25 milliGRAM(s) Oral every 8 hours PRN moderate pain  metoclopramide Injectable 10 milliGRAM(s) IV Push every 6 hours PRN Nausea and/or Vomiting  ondansetron Injectable 4 milliGRAM(s) IV Push every 6 hours PRN Nausea  oxyCODONE    IR 5 milliGRAM(s) Oral every 4 hours PRN Moderate Pain (4 - 6)  oxyCODONE    IR 10 milliGRAM(s) Oral every 4 hours PRN Severe Pain (7 - 10)  HYDROmorphone  Injectable 0.5 milliGRAM(s) IV Push every 3 hours PRN breakthrough pain  HYDROmorphone  Injectable 0.5 milliGRAM(s) IV Push every 10 minutes PRN Severe Pain (7 - 10)  acetaminophen   Tablet 650 milliGRAM(s) Oral every 6 hours PRN For Temp greater than 38 C (100.4 F)  acetaminophen   Tablet. 650 milliGRAM(s) Oral every 6 hours PRN Mild Pain (1 - 3)  benzocaine 15 mG/menthol 3.6 mG Lozenge 1 Lozenge Oral four times a day PRN Sore Throat  bisacodyl Suppository 10 milliGRAM(s) Rectal daily PRN Constipation    Allergy Status Unknown  aspirin (Other)      Vital Signs Last 24 Hrs  T(C): 36.3 (21 Aug 2017 13:58), Max: 37.2 (20 Aug 2017 20:52)  T(F): 97.4 (21 Aug 2017 13:58), Max: 98.9 (20 Aug 2017 20:52)  HR: 59 (21 Aug 2017 13:58) (46 - 59)  BP: 147/69 (21 Aug 2017 13:58) (143/67 - 157/72)  BP(mean): --  RR: 16 (21 Aug 2017 13:58) (15 - 16)  SpO2: 96% (21 Aug 2017 13:58) (93% - 98%)    WEAKNESS: WEAKNESS  No h/o HF  No pertinent family history in first degree relatives  Handoff  MEWS Score: 4 (21-Aug-2017 14:00)  Osteoarthritis of knee, unilateral: left knee  Gastric ulcer  HLD (hyperlipidemia)  HTN (hypertension)  Anterior cervical discectomy with fusion  Cervical spinal cord compression: Cervical spinal cord compression  Constipation: Constipation  Cervical spinal cord compression: Cervical spinal cord compression  Prophylactic measure: Prophylactic measure  Nutrition, metabolism, and development symptoms: Nutrition, metabolism, and development symptoms  HLD (hyperlipidemia): HLD (hyperlipidemia)  Bradycardia: Bradycardia  Essential hypertension: Essential hypertension  Weakness of both upper extremities: Weakness of both upper extremities  Osteoarthritis of knee, unilateral: Osteoarthritis of knee, unilateral  H/O excision of mass: plantar surface of left foot  S/P ovarian cystectomy  ANKLE SWELLING: ANKLE SWELLING  20                      11.9   16.0  )-----------( 286      ( 21 Aug 2017 06:39 )             35.2       08-21    139  |  101  |  16  ----------------------------<  138<H>  4.2   |  27  |  0.70    Ca    8.9      21 Aug 2017 06:37            MD  consult reviewed       ROS                          + reviewed  H/P                          NO CHANGE  GENERAL                 + awake           +alert        -confused          - lethargic  CARDIOVASC          -chest pain    -palpitation         -SOB           -SORENSON  PULMONARY          -cough        -congestion             -wheezing  ENT                           -hoarseness         -sore throat      Gastrointestinal      -nausea         -vomitting         -diarrhea          -pain                                    -incontinent       -dysuria         -frequency       -retention      MS                              -weakness      PSYCH                        +awake           -agitation         -dementia    -delerium  SKIN                            - dry              -rash           -decubitus    PHYSICAL  EXAMINATION     General: Well developed; well nourished; in no acute distress  Eyes: PERRL, EOM intact; conjunctiva and sclera clear  Head: Normocephalic; atraumatic  Neck: Supple; non tender; no masses  Respiratory: No wheezes, rales or rhonchi  Cardiovascular: Regular rate and rhythm. S1 and S2 Normal; No murmurs, gallops or rubs  Gastrointestinal: Soft non-tender non-distended; Normal bowel sounds; No hepatosplenomegaly  Extremities:  No clubbing, cyanosis or edema            Peripheral pulses palpable 2+ bilaterally  Neurological: Alert and oriented x 3, CN 2-12 grossly intact    Assesment/PLAN  66yFemale Osteoarthritis of knee, unilateral  Gastric ulcer  HLD (hyperlipidemia)  HTN (hypertension)  admitted with cervical cord compression POD #3  Doing well with improving LE strength  Eventual Discharge to [facility]  Hemodynamically stable.   Would not restart her b blocker, as she has been in Sinus Bradycardia during this hospitalization.  Continue PT/OOB ->Chair   DVT prophylaxis  Leukocytosis improving, steroids disontinued.   No signs of infection

## 2017-08-22 VITALS
HEART RATE: 52 BPM | SYSTOLIC BLOOD PRESSURE: 140 MMHG | OXYGEN SATURATION: 96 % | RESPIRATION RATE: 15 BRPM | DIASTOLIC BLOOD PRESSURE: 68 MMHG | TEMPERATURE: 98 F

## 2017-08-22 PROCEDURE — 86850 RBC ANTIBODY SCREEN: CPT

## 2017-08-22 PROCEDURE — 80053 COMPREHEN METABOLIC PANEL: CPT

## 2017-08-22 PROCEDURE — 83036 HEMOGLOBIN GLYCOSYLATED A1C: CPT

## 2017-08-22 PROCEDURE — 86900 BLOOD TYPING SEROLOGIC ABO: CPT

## 2017-08-22 PROCEDURE — 86901 BLOOD TYPING SEROLOGIC RH(D): CPT

## 2017-08-22 PROCEDURE — 72100 X-RAY EXAM L-S SPINE 2/3 VWS: CPT

## 2017-08-22 PROCEDURE — 71046 X-RAY EXAM CHEST 2 VIEWS: CPT

## 2017-08-22 PROCEDURE — 85730 THROMBOPLASTIN TIME PARTIAL: CPT

## 2017-08-22 PROCEDURE — 85025 COMPLETE CBC W/AUTO DIFF WBC: CPT

## 2017-08-22 PROCEDURE — 72052 X-RAY EXAM NECK SPINE 6/>VWS: CPT

## 2017-08-22 PROCEDURE — 80048 BASIC METABOLIC PNL TOTAL CA: CPT

## 2017-08-22 PROCEDURE — 72142 MRI NECK SPINE W/DYE: CPT

## 2017-08-22 PROCEDURE — 93306 TTE W/DOPPLER COMPLETE: CPT

## 2017-08-22 PROCEDURE — 72148 MRI LUMBAR SPINE W/O DYE: CPT

## 2017-08-22 PROCEDURE — C1889: CPT

## 2017-08-22 PROCEDURE — 85610 PROTHROMBIN TIME: CPT

## 2017-08-22 PROCEDURE — 97164 PT RE-EVAL EST PLAN CARE: CPT

## 2017-08-22 PROCEDURE — 76000 FLUOROSCOPY <1 HR PHYS/QHP: CPT

## 2017-08-22 PROCEDURE — A9585: CPT

## 2017-08-22 PROCEDURE — 97161 PT EVAL LOW COMPLEX 20 MIN: CPT

## 2017-08-22 PROCEDURE — 97530 THERAPEUTIC ACTIVITIES: CPT

## 2017-08-22 PROCEDURE — 85652 RBC SED RATE AUTOMATED: CPT

## 2017-08-22 PROCEDURE — 36415 COLL VENOUS BLD VENIPUNCTURE: CPT

## 2017-08-22 PROCEDURE — 81001 URINALYSIS AUTO W/SCOPE: CPT

## 2017-08-22 PROCEDURE — 93005 ELECTROCARDIOGRAM TRACING: CPT

## 2017-08-22 PROCEDURE — 72125 CT NECK SPINE W/O DYE: CPT

## 2017-08-22 PROCEDURE — 72156 MRI NECK SPINE W/O & W/DYE: CPT

## 2017-08-22 PROCEDURE — 86803 HEPATITIS C AB TEST: CPT

## 2017-08-22 PROCEDURE — 85045 AUTOMATED RETICULOCYTE COUNT: CPT

## 2017-08-22 PROCEDURE — 99285 EMERGENCY DEPT VISIT HI MDM: CPT | Mod: 25

## 2017-08-22 PROCEDURE — 85027 COMPLETE CBC AUTOMATED: CPT

## 2017-08-22 PROCEDURE — C1713: CPT

## 2017-08-22 PROCEDURE — 97116 GAIT TRAINING THERAPY: CPT

## 2017-08-22 PROCEDURE — 95940 IONM IN OPERATNG ROOM 15 MIN: CPT

## 2017-08-22 RX ADMIN — OXYCODONE HYDROCHLORIDE 5 MILLIGRAM(S): 5 TABLET ORAL at 16:58

## 2017-08-22 RX ADMIN — OXYCODONE HYDROCHLORIDE 5 MILLIGRAM(S): 5 TABLET ORAL at 17:45

## 2017-08-22 RX ADMIN — PANTOPRAZOLE SODIUM 40 MILLIGRAM(S): 20 TABLET, DELAYED RELEASE ORAL at 06:23

## 2017-08-22 RX ADMIN — Medication 6 MILLIGRAM(S): at 16:57

## 2017-08-22 RX ADMIN — Medication 6 MILLIGRAM(S): at 04:58

## 2017-08-22 RX ADMIN — Medication 6 MILLIGRAM(S): at 11:53

## 2017-08-22 RX ADMIN — AMLODIPINE BESYLATE 10 MILLIGRAM(S): 2.5 TABLET ORAL at 06:23

## 2017-08-22 NOTE — PROGRESS NOTE ADULT - SUBJECTIVE AND OBJECTIVE BOX
Did well o/n.     Procedure: ACDF C3-C5  Surgeon: Veronica    Pt comfortable, without complaints  Denies CP, SOB, N/V, numbness/tingling     Vital Signs Last 24 Hrs  T(C): 36.5 (22 Aug 2017 07:12), Max: 36.5 (22 Aug 2017 07:12)  T(F): 97.7 (22 Aug 2017 07:12), Max: 97.7 (22 Aug 2017 07:12)  HR: 55 (22 Aug 2017 07:12) (46 - 59)  BP: 144/68 (22 Aug 2017 07:12) (143/65 - 159/70)  BP(mean): --  RR: 15 (22 Aug 2017 07:12) (15 - 17)  SpO2: 95% (22 Aug 2017 07:12) (95% - 96%)    Dressing C/D/I  General: Pt Alert and oriented   wwp  Sensation: SILT upper and lower  Motor: 5/5 HG/BI/TRI/DELT b/l and 5/5 FHL/GS, 4/5 TA/EHL b/l       A/P: 66yFemale POD#4 s/p above  - Dr Martin present for exam  - Stable  - Pain Control  - DVT ppx: scd  - Post op abx: ancef  - PT, WBS: wbat  - F/U AM Labs

## 2017-08-22 NOTE — OCCUPATIONAL THERAPY INITIAL EVALUATION ADULT - PERTINENT HX OF CURRENT PROBLEM, REHAB EVAL
67yo F presented to [facility] ED with L knee pain and inability to ambulate. Patient was told by her neurologist Dr. Covarrubias to come to the ED due to UE weakness and neck cramping in his office today. Pt also c/o inability to ambulate/chronic L knee pain that leads to inability to "support myself". ACDF C3-C5 8/18/17.

## 2017-08-22 NOTE — PROGRESS NOTE ADULT - SUBJECTIVE AND OBJECTIVE BOX
POST OPERATIVE DAY #: 4  STATUS POST: ACDF C3-5                        SUBJECTIVE: Patient seen and examined.     Pain:  well controlled      OBJECTIVE:     Vital Signs Last 24 Hrs  T(C): 36.6 (22 Aug 2017 08:40), Max: 36.6 (22 Aug 2017 08:40)  T(F): 97.9 (22 Aug 2017 08:40), Max: 97.9 (22 Aug 2017 08:40)  HR: 52 (22 Aug 2017 10:30) (46 - 59)  BP: 162/74 (22 Aug 2017 10:30) (142/69 - 162/74)  BP(mean): --  RR: 16 (22 Aug 2017 08:40) (15 - 17)  SpO2: 96% (22 Aug 2017 08:40) (95% - 96%)      PE:         Dressing: clean/dry/intact   b/l UE:         Sensation: intact to light touch to patient's baseline         warm, well-perfused             I&O's Detail    21 Aug 2017 07:01  -  22 Aug 2017 07:00  --------------------------------------------------------  IN:  Total IN: 0 mL    OUT:    Voided: 1000 mL  Total OUT: 1000 mL    Total NET: -1000 mL      22 Aug 2017 07:01  -  22 Aug 2017 12:03  --------------------------------------------------------  IN:    Oral Fluid: 280 mL  Total IN: 280 mL    OUT:  Total OUT: 0 mL    Total NET: 280 mL          LABS:                        11.9   16.0  )-----------( 286      ( 21 Aug 2017 06:39 )             35.2     08-21    139  |  101  |  16  ----------------------------<  138<H>  4.2   |  27  |  0.70    Ca    8.9      21 Aug 2017 06:37            MEDICATIONS:    diclofenac 25 milliGRAM(s) Oral every 8 hours PRN  metoclopramide Injectable 10 milliGRAM(s) IV Push every 6 hours PRN  ondansetron Injectable 4 milliGRAM(s) IV Push every 6 hours PRN  oxyCODONE    IR 5 milliGRAM(s) Oral every 4 hours PRN  oxyCODONE    IR 10 milliGRAM(s) Oral every 4 hours PRN  HYDROmorphone  Injectable 0.5 milliGRAM(s) IV Push every 3 hours PRN  HYDROmorphone  Injectable 0.5 milliGRAM(s) IV Push every 10 minutes PRN  acetaminophen   Tablet 650 milliGRAM(s) Oral every 6 hours PRN  acetaminophen   Tablet. 650 milliGRAM(s) Oral every 6 hours PRN        RADIOLOGY & ADDITIONAL STUDIES:    ASSESSMENT AND PLAN:     1. Analgesic pain control  2. DVT prophylaxis: ASA  3. Weight Bearing Status:  Weight bearing as tolerated   4. Disposition: Home POST OPERATIVE DAY #: 4  STATUS POST: ACDF C3-5                        SUBJECTIVE: Patient seen and examined.     Pain:  well controlled      OBJECTIVE:     Vital Signs Last 24 Hrs  T(C): 36.6 (22 Aug 2017 08:40), Max: 36.6 (22 Aug 2017 08:40)  T(F): 97.9 (22 Aug 2017 08:40), Max: 97.9 (22 Aug 2017 08:40)  HR: 52 (22 Aug 2017 10:30) (46 - 59)  BP: 162/74 (22 Aug 2017 10:30) (142/69 - 162/74)  BP(mean): --  RR: 16 (22 Aug 2017 08:40) (15 - 17)  SpO2: 96% (22 Aug 2017 08:40) (95% - 96%)      PE:         Dressing: clean/dry/intact   b/l UE:         Sensation: intact to light touch to patient's baseline         warm, well-perfused             I&O's Detail    21 Aug 2017 07:01  -  22 Aug 2017 07:00  --------------------------------------------------------  IN:  Total IN: 0 mL    OUT:    Voided: 1000 mL  Total OUT: 1000 mL    Total NET: -1000 mL      22 Aug 2017 07:01  -  22 Aug 2017 12:03  --------------------------------------------------------  IN:    Oral Fluid: 280 mL  Total IN: 280 mL    OUT:  Total OUT: 0 mL    Total NET: 280 mL          LABS:                        11.9   16.0  )-----------( 286      ( 21 Aug 2017 06:39 )             35.2     08-21    139  |  101  |  16  ----------------------------<  138<H>  4.2   |  27  |  0.70    Ca    8.9      21 Aug 2017 06:37            MEDICATIONS:    diclofenac 25 milliGRAM(s) Oral every 8 hours PRN  metoclopramide Injectable 10 milliGRAM(s) IV Push every 6 hours PRN  ondansetron Injectable 4 milliGRAM(s) IV Push every 6 hours PRN  oxyCODONE    IR 5 milliGRAM(s) Oral every 4 hours PRN  oxyCODONE    IR 10 milliGRAM(s) Oral every 4 hours PRN  HYDROmorphone  Injectable 0.5 milliGRAM(s) IV Push every 3 hours PRN  HYDROmorphone  Injectable 0.5 milliGRAM(s) IV Push every 10 minutes PRN  acetaminophen   Tablet 650 milliGRAM(s) Oral every 6 hours PRN  acetaminophen   Tablet. 650 milliGRAM(s) Oral every 6 hours PRN        RADIOLOGY & ADDITIONAL STUDIES:    ASSESSMENT AND PLAN:     1. Analgesic pain control  2. DVT prophylaxis: SCDs  3. Weight Bearing Status:  Weight bearing as tolerated   4. Disposition: Home

## 2017-08-22 NOTE — OCCUPATIONAL THERAPY INITIAL EVALUATION ADULT - GENERAL OBSERVATIONS, REHAB EVAL
Right hand dominant. Patient cleared for Occupational Therapy by NEVILLE Chaves, patient received seated in B/S chair in non-acute distress, partner present at bedside. +EKG, +IV heplock, +anterior neck dressing C/D/I, +Ballard J collar. Patient denies pain, no complaint.

## 2017-08-22 NOTE — OCCUPATIONAL THERAPY INITIAL EVALUATION ADULT - MANUAL MUSCLE TESTING RESULTS, REHAB EVAL
grossly assessed due to/recent spinal surgery; right upper extremity 5/5 all joints; left upper extremity 5/5 all joints except hand  4/5

## 2017-08-22 NOTE — OCCUPATIONAL THERAPY INITIAL EVALUATION ADULT - NS ASR OT EQUIP NEEDS DISCH
*Would need grab bars in bathroom (near toilet and bath tub) upon DC, will be referred by home therapist from appropriate DME

## 2017-08-22 NOTE — PROGRESS NOTE ADULT - SUBJECTIVE AND OBJECTIVE BOX
HPI:  65yo F with PMH of HTN, HLD, gastric ulcer, L knee osteoarthritis presented to [facility] ED with L knee pain and inability to ambulate admitted with cervical cord compression sp surgical repair. POD #4  Patient reports persistent shoulder pain, but slightly decreased from yesterday.  Still mild nausea  +ambulated with PT this morning.   Increased LE strength since surgery    In ED:   Vital Signs  T(C): 37.1 (16 Aug 2017 18:52), Max: 37.1 (16 Aug 2017 18:52)  T(F): 98.7 (16 Aug 2017 18:52), Max: 98.7 (16 Aug 2017 18:52)  HR: 54 (16 Aug 2017 18:52) (54 - 54)  BP: 142/84 (16 Aug 2017 18:52) (142/84 - 176/86)  RR: 16 (16 Aug 2017 18:52) (16 - 16)  SpO2: 98% (16 Aug 2017 18:52) (98% - 99%)  C-spine MRI ordered.   Admitted to [facility] medical floor for PT eval, rehab. (16 Aug 2017 18:52)    PAST MEDICAL & SURGICAL HISTORY:  Osteoarthritis of knee, unilateral: left knee  Gastric ulcer  HLD (hyperlipidemia)  HTN (hypertension)  H/O excision of mass: plantar surface of left foot  S/P ovarian cystectomy      MEDICATIONS  (STANDING):  amLODIPine   Tablet 10 milliGRAM(s) Oral daily  atorvastatin 20 milliGRAM(s) Oral at bedtime  insulin lispro (HumaLOG) corrective regimen sliding scale   SubCutaneous Before meals and at bedtime  dextrose 5%. 1000 milliLiter(s) (50 mL/Hr) IV Continuous <Continuous>  dextrose 50% Injectable 12.5 Gram(s) IV Push once  dextrose 50% Injectable 25 Gram(s) IV Push once  dextrose 50% Injectable 25 Gram(s) IV Push once  dexamethasone  Injectable 6 milliGRAM(s) IV Push every 6 hours  pantoprazole    Tablet 40 milliGRAM(s) Oral before breakfast  lactated ringers. 1000 milliLiter(s) (120 mL/Hr) IV Continuous <Continuous>  lactated ringers. 1000 milliLiter(s) (70 mL/Hr) IV Continuous <Continuous>    MEDICATIONS  (PRN):  dextrose Gel 1 Dose(s) Oral once PRN Blood Glucose LESS THAN 70 milliGRAM(s)/deciliter  glucagon  Injectable 1 milliGRAM(s) IntraMuscular once PRN Glucose LESS THAN 70 milligrams/deciliter  bisacodyl 5 milliGRAM(s) Oral every 12 hours PRN Constipation  diclofenac 25 milliGRAM(s) Oral every 8 hours PRN moderate pain  metoclopramide Injectable 10 milliGRAM(s) IV Push every 6 hours PRN Nausea and/or Vomiting  ondansetron Injectable 4 milliGRAM(s) IV Push every 6 hours PRN Nausea  oxyCODONE    IR 5 milliGRAM(s) Oral every 4 hours PRN Moderate Pain (4 - 6)  oxyCODONE    IR 10 milliGRAM(s) Oral every 4 hours PRN Severe Pain (7 - 10)  HYDROmorphone  Injectable 0.5 milliGRAM(s) IV Push every 3 hours PRN breakthrough pain  HYDROmorphone  Injectable 0.5 milliGRAM(s) IV Push every 10 minutes PRN Severe Pain (7 - 10)  acetaminophen   Tablet 650 milliGRAM(s) Oral every 6 hours PRN For Temp greater than 38 C (100.4 F)  acetaminophen   Tablet. 650 milliGRAM(s) Oral every 6 hours PRN Mild Pain (1 - 3)  benzocaine 15 mG/menthol 3.6 mG Lozenge 1 Lozenge Oral four times a day PRN Sore Throat  bisacodyl Suppository 10 milliGRAM(s) Rectal daily PRN Constipation  aluminum hydroxide/magnesium hydroxide/simethicone Suspension 30 milliLiter(s) Oral every 4 hours PRN Dyspepsia    Allergy Status Unknown  aspirin (Other)      Vital Signs Last 24 Hrs  T(C): 36.6 (22 Aug 2017 08:40), Max: 36.6 (22 Aug 2017 08:40)  T(F): 97.9 (22 Aug 2017 08:40), Max: 97.9 (22 Aug 2017 08:40)  HR: 52 (22 Aug 2017 10:30) (46 - 56)  BP: 162/74 (22 Aug 2017 10:30) (142/69 - 162/74)  BP(mean): --  RR: 16 (22 Aug 2017 08:40) (15 - 17)  SpO2: 96% (22 Aug 2017 08:40) (95% - 96%)    WEAKNESS: WEAKNESS  No h/o HF  No pertinent family history in first degree relatives  Handoff  MEWS Score: 3 (22-Aug-2017 11:08)  Osteoarthritis of knee, unilateral: left knee  Gastric ulcer  HLD (hyperlipidemia)  HTN (hypertension)  Anterior cervical discectomy with fusion  Cervical spinal cord compression: Cervical spinal cord compression  Constipation: Constipation  Cervical spinal cord compression: Cervical spinal cord compression  Prophylactic measure: Prophylactic measure  Nutrition, metabolism, and development symptoms: Nutrition, metabolism, and development symptoms  HLD (hyperlipidemia): HLD (hyperlipidemia)  Bradycardia: Bradycardia  Essential hypertension: Essential hypertension  Weakness of both upper extremities: Weakness of both upper extremities  Osteoarthritis of knee, unilateral: Osteoarthritis of knee, unilateral  H/O excision of mass: plantar surface of left foot  S/P ovarian cystectomy  ANKLE SWELLING: ANKLE SWELLING  20                            11.9   16.0  )-----------( 286      ( 21 Aug 2017 06:39 )             35.2       08-21    139  |  101  |  16  ----------------------------<  138<H>  4.2   |  27  |  0.70    Ca    8.9      21 Aug 2017 06:37            MD  consult reviewed       ROS                          + reviewed  H/P                          NO CHANGE  GENERAL                 + awake           +alert        -confused          - lethargic  CARDIOVASC          -chest pain    -palpitation         -SOB           -SORENSON  PULMONARY          -cough        -congestion             -wheezing  ENT                           -hoarseness         -sore throat      Gastrointestinal      -nausea         -vomitting         -diarrhea          -pain                                    -incontinent       -dysuria         -frequency       -retention      MS                              -weakness      PSYCH                        +awake           -agitation         -dementia    -delerium  SKIN                            - dry              -rash           -decubitus    PHYSICAL  EXAMINATION     General: Well developed; well nourished; in no acute distress  Eyes: PERRL, EOM intact; conjunctiva and sclera clear  Head: Normocephalic; atraumatic  Neck: Supple; non tender; no masses  Respiratory: No wheezes, rales or rhonchi  Cardiovascular: Regular rate and rhythm. S1 and S2 Normal; No murmurs, gallops or rubs  Gastrointestinal: Soft non-tender non-distended; Normal bowel sounds; No hepatosplenomegaly  Extremities:  No clubbing, cyanosis or edema            Peripheral pulses palpable 2+ bilaterally, increased motor strength  Neurological: Alert and oriented x 3, CN 2-12 grossly intact    Assesment/PLAN  66yFemale Osteoarthritis of knee, unilateral  Gastric ulcer  HLD (hyperlipidemia)  HTN (hypertension)  POD #4, clinically appears to be improving  Pain meds prn  Do not restart her b blocker in light of her sinus bradycardia  DVT prophylaxis  Hemodynamically stable.

## 2017-08-22 NOTE — OCCUPATIONAL THERAPY INITIAL EVALUATION ADULT - PLANNED THERAPY INTERVENTIONS, OT EVAL
IADL retraining/motor coordination training/ADL retraining/fine motor coordination training/balance training/strengthening/neuromuscular re-education/transfer training

## 2017-08-22 NOTE — OCCUPATIONAL THERAPY INITIAL EVALUATION ADULT - COORDINATION ASSESSED, REHAB EVAL
finger to nose/grossly intact right upper extremity, slightly decreased speed and accuracy left upper extremity however no dysmetria grossly intact right upper extremity, slightly decreased speed and accuracy left upper extremity however no dysmetria/finger to nose

## 2017-08-22 NOTE — OCCUPATIONAL THERAPY INITIAL EVALUATION ADULT - RANGE OF MOTION EXAMINATION, UPPER EXTREMITY
bilateral UE Active ROM was WFL  (within functional limits)/bilateral UE Passive ROM was WFL  (within functional limits)/except slightly decreased active extension left digit 2 (chronic as per patient)

## 2017-08-22 NOTE — PROGRESS NOTE ADULT - PROVIDER SPECIALTY LIST ADULT
Internal Medicine
Orthopedics
Internal Medicine

## 2017-08-22 NOTE — OCCUPATIONAL THERAPY INITIAL EVALUATION ADULT - ADDITIONAL COMMENTS
Patient was ambulating with RW prior to admission, reports important left > right lower extremity weakness with h/o falls (unable to specify #). Reports needing assistance from daughter/son for lower body dressing (daily) and instrumental activities of daily living.

## 2017-08-25 DIAGNOSIS — Z88.6 ALLERGY STATUS TO ANALGESIC AGENT: ICD-10-CM

## 2017-08-25 DIAGNOSIS — M17.12 UNILATERAL PRIMARY OSTEOARTHRITIS, LEFT KNEE: ICD-10-CM

## 2017-08-25 DIAGNOSIS — E66.9 OBESITY, UNSPECIFIED: ICD-10-CM

## 2017-08-25 DIAGNOSIS — M50.01 CERVICAL DISC DISORDER WITH MYELOPATHY, HIGH CERVICAL REGION: ICD-10-CM

## 2017-08-25 DIAGNOSIS — E78.5 HYPERLIPIDEMIA, UNSPECIFIED: ICD-10-CM

## 2017-08-25 DIAGNOSIS — K25.9 GASTRIC ULCER, UNSPECIFIED AS ACUTE OR CHRONIC, WITHOUT HEMORRHAGE OR PERFORATION: ICD-10-CM

## 2017-08-25 DIAGNOSIS — I10 ESSENTIAL (PRIMARY) HYPERTENSION: ICD-10-CM

## 2017-08-25 DIAGNOSIS — K59.00 CONSTIPATION, UNSPECIFIED: ICD-10-CM

## 2017-08-25 DIAGNOSIS — M25.78 OSTEOPHYTE, VERTEBRAE: ICD-10-CM

## 2017-08-25 DIAGNOSIS — R00.1 BRADYCARDIA, UNSPECIFIED: ICD-10-CM

## 2017-08-29 ENCOUNTER — APPOINTMENT (OUTPATIENT)
Dept: ORTHOPEDIC SURGERY | Facility: CLINIC | Age: 67
End: 2017-08-29

## 2017-09-11 ENCOUNTER — OUTPATIENT (OUTPATIENT)
Dept: OUTPATIENT SERVICES | Facility: HOSPITAL | Age: 67
LOS: 1 days | End: 2017-09-11
Payer: COMMERCIAL

## 2017-09-11 DIAGNOSIS — Z98.890 OTHER SPECIFIED POSTPROCEDURAL STATES: Chronic | ICD-10-CM

## 2017-09-11 PROCEDURE — 72040 X-RAY EXAM NECK SPINE 2-3 VW: CPT

## 2017-09-11 PROCEDURE — 72040 X-RAY EXAM NECK SPINE 2-3 VW: CPT | Mod: 26

## 2017-10-19 ENCOUNTER — APPOINTMENT (OUTPATIENT)
Dept: HEART AND VASCULAR | Facility: CLINIC | Age: 67
End: 2017-10-19
Payer: COMMERCIAL

## 2017-10-19 VITALS
DIASTOLIC BLOOD PRESSURE: 62 MMHG | OXYGEN SATURATION: 97 % | BODY MASS INDEX: 37.28 KG/M2 | HEART RATE: 59 BPM | WEIGHT: 232 LBS | SYSTOLIC BLOOD PRESSURE: 110 MMHG | TEMPERATURE: 97.7 F | HEIGHT: 66 IN

## 2017-10-19 DIAGNOSIS — E78.5 HYPERLIPIDEMIA, UNSPECIFIED: ICD-10-CM

## 2017-10-19 DIAGNOSIS — I10 ESSENTIAL (PRIMARY) HYPERTENSION: ICD-10-CM

## 2017-10-19 DIAGNOSIS — R00.2 PALPITATIONS: ICD-10-CM

## 2017-10-19 PROCEDURE — 99204 OFFICE O/P NEW MOD 45 MIN: CPT | Mod: 25

## 2017-10-19 PROCEDURE — 93306 TTE W/DOPPLER COMPLETE: CPT

## 2017-10-19 RX ORDER — AMLODIPINE BESYLATE 5 MG/1
TABLET ORAL
Refills: 0 | Status: ACTIVE | COMMUNITY

## 2017-10-19 RX ORDER — VALSARTAN 80 MG/1
80 TABLET, COATED ORAL
Refills: 0 | Status: ACTIVE | COMMUNITY

## 2017-10-19 RX ORDER — DICLOFENAC SODIUM 25 MG/1
25 TABLET, DELAYED RELEASE ORAL
Refills: 0 | Status: ACTIVE | COMMUNITY

## 2017-10-19 RX ORDER — POTASSIUM CHLORIDE 750 MG/1
10 CAPSULE, EXTENDED RELEASE ORAL
Qty: 30 | Refills: 0 | Status: ACTIVE | COMMUNITY
Start: 2017-09-12

## 2017-10-19 RX ORDER — FUROSEMIDE 40 MG/1
40 TABLET ORAL
Qty: 90 | Refills: 0 | Status: ACTIVE | COMMUNITY
Start: 2017-09-12

## 2017-10-19 RX ORDER — FUROSEMIDE 20 MG/1
20 TABLET ORAL
Refills: 0 | Status: ACTIVE | COMMUNITY

## 2017-10-19 RX ORDER — DICLOFENAC POTASSIUM 50 MG/1
50 TABLET, COATED ORAL
Qty: 15 | Refills: 0 | Status: ACTIVE | COMMUNITY
Start: 2017-07-15

## 2017-10-19 RX ORDER — OXYCODONE AND ACETAMINOPHEN 5; 325 MG/1; MG/1
5-325 TABLET ORAL
Qty: 60 | Refills: 0 | Status: ACTIVE | COMMUNITY
Start: 2017-08-22

## 2017-10-19 RX ORDER — ATORVASTATIN CALCIUM 20 MG/1
20 TABLET, FILM COATED ORAL
Refills: 0 | Status: ACTIVE | COMMUNITY

## 2017-11-02 ENCOUNTER — APPOINTMENT (OUTPATIENT)
Dept: HEART AND VASCULAR | Facility: CLINIC | Age: 67
End: 2017-11-02
Payer: MEDICARE

## 2017-11-02 DIAGNOSIS — R06.09 OTHER FORMS OF DYSPNEA: ICD-10-CM

## 2017-11-02 PROCEDURE — 93015 CV STRESS TEST SUPVJ I&R: CPT

## 2017-11-02 PROCEDURE — A9500: CPT

## 2017-11-02 PROCEDURE — 99214 OFFICE O/P EST MOD 30 MIN: CPT | Mod: 25

## 2017-11-02 PROCEDURE — 78452 HT MUSCLE IMAGE SPECT MULT: CPT

## 2017-12-12 VITALS
OXYGEN SATURATION: 96 % | RESPIRATION RATE: 16 BRPM | DIASTOLIC BLOOD PRESSURE: 76 MMHG | TEMPERATURE: 97 F | HEIGHT: 66 IN | SYSTOLIC BLOOD PRESSURE: 176 MMHG | HEART RATE: 60 BPM | WEIGHT: 235.01 LBS

## 2017-12-12 RX ORDER — INFLUENZA VIRUS VACCINE 15; 15; 15; 15 UG/.5ML; UG/.5ML; UG/.5ML; UG/.5ML
0.5 SUSPENSION INTRAMUSCULAR ONCE
Qty: 0 | Refills: 0 | Status: COMPLETED | OUTPATIENT
Start: 2017-12-15 | End: 2017-12-15

## 2017-12-12 NOTE — PATIENT PROFILE ADULT. - PSH
H/O excision of mass  plantar surface of left foot  S/P hysterectomy  partial  S/P ovarian cystectomy    Surgery, elective  cervical spine

## 2017-12-13 ENCOUNTER — INPATIENT (INPATIENT)
Facility: HOSPITAL | Age: 67
LOS: 2 days | Discharge: NURSING, REHAB OR LONG-TERM CARE | DRG: 516 | End: 2017-12-16
Attending: ORTHOPAEDIC SURGERY | Admitting: ORTHOPAEDIC SURGERY
Payer: MEDICARE

## 2017-12-13 DIAGNOSIS — Z90.710 ACQUIRED ABSENCE OF BOTH CERVIX AND UTERUS: Chronic | ICD-10-CM

## 2017-12-13 DIAGNOSIS — Z98.890 OTHER SPECIFIED POSTPROCEDURAL STATES: Chronic | ICD-10-CM

## 2017-12-13 DIAGNOSIS — Z41.9 ENCOUNTER FOR PROCEDURE FOR PURPOSES OTHER THAN REMEDYING HEALTH STATE, UNSPECIFIED: Chronic | ICD-10-CM

## 2017-12-13 DIAGNOSIS — M54.9 DORSALGIA, UNSPECIFIED: ICD-10-CM

## 2017-12-13 LAB
BASOPHILS NFR BLD AUTO: 0.1 % — SIGNIFICANT CHANGE UP (ref 0–2)
EOSINOPHIL NFR BLD AUTO: 0 % — SIGNIFICANT CHANGE UP (ref 0–6)
HCT VFR BLD CALC: 34.8 % — SIGNIFICANT CHANGE UP (ref 34.5–45)
HGB BLD-MCNC: 11.2 G/DL — LOW (ref 11.5–15.5)
LYMPHOCYTES # BLD AUTO: 8.4 % — LOW (ref 13–44)
MCHC RBC-ENTMCNC: 30.3 PG — SIGNIFICANT CHANGE UP (ref 27–34)
MCHC RBC-ENTMCNC: 32.2 G/DL — SIGNIFICANT CHANGE UP (ref 32–36)
MCV RBC AUTO: 94.1 FL — SIGNIFICANT CHANGE UP (ref 80–100)
MONOCYTES NFR BLD AUTO: 2.6 % — SIGNIFICANT CHANGE UP (ref 2–14)
MRSA PCR RESULT.: NEGATIVE — SIGNIFICANT CHANGE UP
NEUTROPHILS NFR BLD AUTO: 88.9 % — HIGH (ref 43–77)
PLATELET # BLD AUTO: 243 K/UL — SIGNIFICANT CHANGE UP (ref 150–400)
RBC # BLD: 3.7 M/UL — LOW (ref 3.8–5.2)
RBC # FLD: 14.4 % — SIGNIFICANT CHANGE UP (ref 10.3–16.9)
S AUREUS DNA NOSE QL NAA+PROBE: NEGATIVE — SIGNIFICANT CHANGE UP
WBC # BLD: 16.1 K/UL — HIGH (ref 3.8–10.5)
WBC # FLD AUTO: 16.1 K/UL — HIGH (ref 3.8–10.5)

## 2017-12-13 PROCEDURE — 73030 X-RAY EXAM OF SHOULDER: CPT | Mod: 26,LT

## 2017-12-13 RX ORDER — CYCLOBENZAPRINE HYDROCHLORIDE 10 MG/1
10 TABLET, FILM COATED ORAL EVERY 8 HOURS
Qty: 0 | Refills: 0 | Status: DISCONTINUED | OUTPATIENT
Start: 2017-12-13 | End: 2017-12-16

## 2017-12-13 RX ORDER — FUROSEMIDE 40 MG
1 TABLET ORAL
Qty: 0 | Refills: 0 | COMMUNITY

## 2017-12-13 RX ORDER — ACETAMINOPHEN 500 MG
650 TABLET ORAL EVERY 6 HOURS
Qty: 0 | Refills: 0 | Status: DISCONTINUED | OUTPATIENT
Start: 2017-12-13 | End: 2017-12-16

## 2017-12-13 RX ORDER — SODIUM CHLORIDE 9 MG/ML
1000 INJECTION, SOLUTION INTRAVENOUS
Qty: 0 | Refills: 0 | Status: DISCONTINUED | OUTPATIENT
Start: 2017-12-13 | End: 2017-12-15

## 2017-12-13 RX ORDER — OXYCODONE AND ACETAMINOPHEN 5; 325 MG/1; MG/1
1 TABLET ORAL EVERY 4 HOURS
Qty: 0 | Refills: 0 | Status: DISCONTINUED | OUTPATIENT
Start: 2017-12-13 | End: 2017-12-16

## 2017-12-13 RX ORDER — FOLIC ACID 0.8 MG
1 TABLET ORAL DAILY
Qty: 0 | Refills: 0 | Status: DISCONTINUED | OUTPATIENT
Start: 2017-12-13 | End: 2017-12-16

## 2017-12-13 RX ORDER — POTASSIUM CHLORIDE 20 MEQ
1 PACKET (EA) ORAL
Qty: 0 | Refills: 0 | COMMUNITY

## 2017-12-13 RX ORDER — DOCUSATE SODIUM 100 MG
100 CAPSULE ORAL THREE TIMES A DAY
Qty: 0 | Refills: 0 | Status: DISCONTINUED | OUTPATIENT
Start: 2017-12-13 | End: 2017-12-16

## 2017-12-13 RX ORDER — CEFAZOLIN SODIUM 1 G
2000 VIAL (EA) INJECTION EVERY 8 HOURS
Qty: 0 | Refills: 0 | Status: COMPLETED | OUTPATIENT
Start: 2017-12-14 | End: 2017-12-14

## 2017-12-13 RX ORDER — MAGNESIUM HYDROXIDE 400 MG/1
30 TABLET, CHEWABLE ORAL EVERY 12 HOURS
Qty: 0 | Refills: 0 | Status: DISCONTINUED | OUTPATIENT
Start: 2017-12-13 | End: 2017-12-16

## 2017-12-13 RX ORDER — OXYCODONE AND ACETAMINOPHEN 5; 325 MG/1; MG/1
2 TABLET ORAL EVERY 4 HOURS
Qty: 0 | Refills: 0 | Status: DISCONTINUED | OUTPATIENT
Start: 2017-12-13 | End: 2017-12-16

## 2017-12-13 RX ORDER — SENNA PLUS 8.6 MG/1
2 TABLET ORAL AT BEDTIME
Qty: 0 | Refills: 0 | Status: DISCONTINUED | OUTPATIENT
Start: 2017-12-13 | End: 2017-12-16

## 2017-12-13 NOTE — H&P ADULT - NSHPLABSRESULTS_GEN_ALL_CORE
preop cbc/bmp/coags/ua wnl per medical clearance   ekg wnl per medical clearance   cxr wnl per medical clearance

## 2017-12-13 NOTE — H&P ADULT - NSHPPHYSICALEXAM_GEN_ALL_CORE
MSK- Decreased ROM secondary to pain  SLT INTACT, DP/PT 1+ secondary to swelling, EHL/TA/GS 5/5 2+ pitting edema with hyperpigmentation at b/l ankles and dorsum of feet  Rest of PE per medical clearance

## 2017-12-13 NOTE — PROGRESS NOTE ADULT - SUBJECTIVE AND OBJECTIVE BOX
Ortho Post Op Check    Procedure: L2-L5 Lami  Surgeon: Veronica  Laterality:    Pt comfortable without complaints, pain controlled  Denies CP, SOB, N/V, numbness/tingling     Vital Signs Last 24 Hrs  T(C): --  T(F): --  HR: --  BP: --  BP(mean): --  RR: --  SpO2: --  AVSS    NAD, non labored respirations  Affected extremity:          Dressing: clean/dry/intact          Drains: 1         Sensation: [x ] intact to light touch  [ ] decreased                              Vascular: [x ] warm well perfused; capillary refill <3 seconds          Motor exam: [x ]         [ ] Upper extremity                   Sophia (c5)   Bi(c5/6)   WE(c6)   EE(c7)    (c8)                                                R           5              5            5             5             5                                               L            5              5            5             5            5         [x ] Lower extremity                   IP(L2)     Q(L3)     TA(L4)     EHL(L5)     GS(s1)                                                 R          5           5          5            5              5                                               L           5           5         5             5              5            Post-op X-Ray:    A/P: 67yFemale POD#0 s/p   - Stable  - Pain Control  - DVT ppx: SCDs  - Post op abx: Ancef  - PT, WBS: WBAT    Ortho Pager [phone]

## 2017-12-13 NOTE — BRIEF OPERATIVE NOTE - PROCEDURE
<<-----Click on this checkbox to enter Procedure Lumbar laminectomy at multiple levels  12/13/2017    Active  PERLITA

## 2017-12-13 NOTE — H&P ADULT - HISTORY OF PRESENT ILLNESS
66yo f c/o low back pain since July 2017. 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 Pt. reports after having ACDF performed she began to have difficulty lifting her left foot. Pt. c/o b/l knees giving out on her and buckling when trying to walk. Pt. denies any radiation of pain. Pt. states she has difficulty with gettng up from sitting down. Denies any numbness/tingling in b/l les. Pt. uses cane assistance. Presents today for elective BILATERAL LUMBAR LAMINECTOMY L2-L5.

## 2017-12-14 ENCOUNTER — TRANSCRIPTION ENCOUNTER (OUTPATIENT)
Age: 67
End: 2017-12-14

## 2017-12-14 LAB
ANION GAP SERPL CALC-SCNC: 13 MMOL/L — SIGNIFICANT CHANGE UP (ref 5–17)
BUN SERPL-MCNC: 12 MG/DL — SIGNIFICANT CHANGE UP (ref 7–23)
CALCIUM SERPL-MCNC: 9.2 MG/DL — SIGNIFICANT CHANGE UP (ref 8.4–10.5)
CHLORIDE SERPL-SCNC: 101 MMOL/L — SIGNIFICANT CHANGE UP (ref 96–108)
CO2 SERPL-SCNC: 27 MMOL/L — SIGNIFICANT CHANGE UP (ref 22–31)
CREAT SERPL-MCNC: 0.83 MG/DL — SIGNIFICANT CHANGE UP (ref 0.5–1.3)
GLUCOSE SERPL-MCNC: 148 MG/DL — HIGH (ref 70–99)
POTASSIUM SERPL-MCNC: 4.6 MMOL/L — SIGNIFICANT CHANGE UP (ref 3.5–5.3)
POTASSIUM SERPL-SCNC: 4.6 MMOL/L — SIGNIFICANT CHANGE UP (ref 3.5–5.3)
SODIUM SERPL-SCNC: 141 MMOL/L — SIGNIFICANT CHANGE UP (ref 135–145)

## 2017-12-14 RX ORDER — ONDANSETRON 8 MG/1
4 TABLET, FILM COATED ORAL EVERY 6 HOURS
Qty: 0 | Refills: 0 | Status: DISCONTINUED | OUTPATIENT
Start: 2017-12-14 | End: 2017-12-16

## 2017-12-14 RX ADMIN — OXYCODONE AND ACETAMINOPHEN 2 TABLET(S): 5; 325 TABLET ORAL at 21:30

## 2017-12-14 RX ADMIN — SODIUM CHLORIDE 100 MILLILITER(S): 9 INJECTION, SOLUTION INTRAVENOUS at 02:07

## 2017-12-14 RX ADMIN — CYCLOBENZAPRINE HYDROCHLORIDE 10 MILLIGRAM(S): 10 TABLET, FILM COATED ORAL at 21:08

## 2017-12-14 RX ADMIN — Medication 100 MILLIGRAM(S): at 21:08

## 2017-12-14 RX ADMIN — OXYCODONE AND ACETAMINOPHEN 2 TABLET(S): 5; 325 TABLET ORAL at 07:15

## 2017-12-14 RX ADMIN — Medication 1 TABLET(S): at 14:07

## 2017-12-14 RX ADMIN — Medication 1 TABLET(S): at 21:08

## 2017-12-14 RX ADMIN — OXYCODONE AND ACETAMINOPHEN 2 TABLET(S): 5; 325 TABLET ORAL at 20:34

## 2017-12-14 RX ADMIN — OXYCODONE AND ACETAMINOPHEN 1 TABLET(S): 5; 325 TABLET ORAL at 14:10

## 2017-12-14 RX ADMIN — Medication 100 MILLIGRAM(S): at 14:08

## 2017-12-14 RX ADMIN — Medication 1 MILLIGRAM(S): at 11:30

## 2017-12-14 RX ADMIN — Medication 100 MILLIGRAM(S): at 06:15

## 2017-12-14 RX ADMIN — OXYCODONE AND ACETAMINOPHEN 1 TABLET(S): 5; 325 TABLET ORAL at 14:08

## 2017-12-14 RX ADMIN — OXYCODONE AND ACETAMINOPHEN 2 TABLET(S): 5; 325 TABLET ORAL at 03:11

## 2017-12-14 RX ADMIN — Medication 100 MILLIGRAM(S): at 11:33

## 2017-12-14 RX ADMIN — Medication 1 TABLET(S): at 11:30

## 2017-12-14 RX ADMIN — OXYCODONE AND ACETAMINOPHEN 2 TABLET(S): 5; 325 TABLET ORAL at 06:16

## 2017-12-14 RX ADMIN — Medication 100 MILLIGRAM(S): at 02:05

## 2017-12-14 RX ADMIN — Medication 1 TABLET(S): at 06:52

## 2017-12-14 RX ADMIN — CYCLOBENZAPRINE HYDROCHLORIDE 10 MILLIGRAM(S): 10 TABLET, FILM COATED ORAL at 06:15

## 2017-12-14 RX ADMIN — CYCLOBENZAPRINE HYDROCHLORIDE 10 MILLIGRAM(S): 10 TABLET, FILM COATED ORAL at 14:07

## 2017-12-14 RX ADMIN — OXYCODONE AND ACETAMINOPHEN 2 TABLET(S): 5; 325 TABLET ORAL at 02:06

## 2017-12-14 RX ADMIN — SENNA PLUS 2 TABLET(S): 8.6 TABLET ORAL at 21:08

## 2017-12-14 NOTE — PHYSICAL THERAPY INITIAL EVALUATION ADULT - ADDITIONAL COMMENTS
Patient reports she uses rolling walker inside apartment, wheelchair outside apartment; family and friends help to get patient up and down 10 stairs to enter/exit building

## 2017-12-14 NOTE — DISCHARGE NOTE ADULT - CARE PLAN
Principal Discharge DX:	Chronic back pain, unspecified back location, unspecified back pain laterality  Goal:	Improvement after surgery.  Instructions for follow-up, activity and diet:	No strenuous activity (bending/twisting), heavy lifting, driving or returning to work until cleared by MD.  Change dressing daily with gauze/tape till post-op day #5, then leave incision open to air.  You may shower post-op day#5, keep incision clean and dry.   Try to have regular bowel movements, take stool softener or laxative if necessary.  May take pepcid or zantac for upset stomach.  May apply ice to affected areas to decrease swelling.  Call to schedule an appt with Dr. Martin for follow up, if you have staples or sutures they will be removed in office.  Contact your doctor if you experience: fever greater than 101.5, chills, chest pain, difficulty breathing, redness or excessive drainage around the incision, other concerns.

## 2017-12-14 NOTE — DISCHARGE NOTE ADULT - PATIENT PORTAL LINK FT
“You can access the [employer] Patient Portal, offered by [facility], by registering with the following website: [email or web address]”

## 2017-12-14 NOTE — PROGRESS NOTE ADULT - SUBJECTIVE AND OBJECTIVE BOX
ORTHO NOTE    [x ] Pt seen/examined.  [ ] Pt without any complaints/in NAD.    [x ] Pt complains of: 6/10 incisional pain, non radiating.      ROS: [ ] Fever  [ ] Chills  [ ] CP [ ] SOB [ ] Dysnea  [ ] Palpitations [ ] Cough [ ] N/V/C/D [ ] Paresthia [ ] Other     [ ] ROS  otherwise negative    .    PHYSICAL EXAM:    Vital Signs Last 24 Hrs  T(C): 36.7 (14 Dec 2017 09:33), Max: 37.1 (13 Dec 2017 22:15)  T(F): 98 (14 Dec 2017 09:33), Max: 98.8 (13 Dec 2017 22:15)  HR: 73 (14 Dec 2017 09:33) (64 - 87)  BP: 120/79 (14 Dec 2017 09:33) (115/69 - 152/78)  BP(mean): --  RR: 16 (14 Dec 2017 09:33) (16 - 18)  SpO2: 94% (14 Dec 2017 09:33) (94% - 99%)    I&O's Detail    13 Dec 2017 07:01  -  14 Dec 2017 07:00  --------------------------------------------------------  IN:    IV PiggyBack: 50 mL    lactated ringers.: 950 mL    Oral Fluid: 500 mL  Total IN: 1500 mL    OUT:    Accordian: 170 mL    Voided: 1150 mL  Total OUT: 1320 mL    Total NET: 180 mL      14 Dec 2017 07:01  -  14 Dec 2017 13:33  --------------------------------------------------------  IN:    Oral Fluid: 950 mL  Total IN: 950 mL    OUT:  Total OUT: 0 mL    Total NET: 950 mL           CAPILLARY BLOOD GLUCOSE                      Neuro:    Lungs:    CV:    ABD:     Ext: Dressing CDI  HV x1  5/5 FHL EHL GS TA  SILT WWP B/L LE    LABS   13 Dec 2017 23:24    141    |  101    |  12     ----------------------------<  148    4.6     |  27     |  0.83     Ca    9.2        13 Dec 2017 23:24                                   11.2   16.1  )-----------( 243      ( 13 Dec 2017 23:24 )             34.8                 [ ] Other Labs  [ ] None ordered            Please check or [place] when present:  •  Heart Failure:    [ ] Acute        [ ]  Acute on Chronic        [ ] Chronic         [ ] Diastolic     [ ]  Combined    •  SAJI:     [ ] ATN        [ ]  Renal medullary necrosis       [ ]  Renal cortical necrosis                  [ ] Other pathological Lesion:  •  CKD:  [ ] Stage I   [ ] Stage II  [ ] Stage III    [ ]Stage IV   [ ]  CKD V   [ ]  Other/Unspecified:    •  Abdominal Nutritional Status:   [ ] Malnutrition-See Nutrition note    [ ] Cachexia   [ ]  Other        [ ] Supplement ordered:            [ ] Morbid Obesity: BMI >=40         ASSESSMENT/PLAN:      STATUS POST: Lami L2-5 POD1  HV x1 remain.    CONTINUE:          [ ] PT    [ ] DVT PPX-SCD    [ ] Pain Mgt    [ ] Dispo plan-Pending PT evaluation. ORTHO NOTE    [x ] Pt seen/examined.  [ ] Pt without any complaints/in NAD.    [x ] Pt complains of: 6/10 incisional pain, non radiating.      ROS: [ ] Fever  [ ] Chills  [ ] CP [ ] SOB [ ] Dysnea  [ ] Palpitations [ ] Cough [ ] N/V/C/D [ ] Paresthia [ ] Other     [ ] ROS  otherwise negative    .    PHYSICAL EXAM:    Vital Signs Last 24 Hrs  T(C): 36.7 (14 Dec 2017 09:33), Max: 37.1 (13 Dec 2017 22:15)  T(F): 98 (14 Dec 2017 09:33), Max: 98.8 (13 Dec 2017 22:15)  HR: 73 (14 Dec 2017 09:33) (64 - 87)  BP: 120/79 (14 Dec 2017 09:33) (115/69 - 152/78)  BP(mean): --  RR: 16 (14 Dec 2017 09:33) (16 - 18)  SpO2: 94% (14 Dec 2017 09:33) (94% - 99%)    I&O's Detail    13 Dec 2017 07:01  -  14 Dec 2017 07:00  --------------------------------------------------------  IN:    IV PiggyBack: 50 mL    lactated ringers.: 950 mL    Oral Fluid: 500 mL  Total IN: 1500 mL    OUT:    Accordian: 170 mL    Voided: 1150 mL  Total OUT: 1320 mL    Total NET: 180 mL      14 Dec 2017 07:01  -  14 Dec 2017 13:33  --------------------------------------------------------  IN:    Oral Fluid: 950 mL  Total IN: 950 mL    OUT:  Total OUT: 0 mL    Total NET: 950 mL           CAPILLARY BLOOD GLUCOSE                      Neuro:    Lungs:    CV:    ABD:     Ext: Dressing CDI  HV x1  5/5 FHL EHL GS TA  SILT WWP B/L LE    LABS   13 Dec 2017 23:24    141    |  101    |  12     ----------------------------<  148    4.6     |  27     |  0.83     Ca    9.2        13 Dec 2017 23:24                                   11.2   16.1  )-----------( 243      ( 13 Dec 2017 23:24 )             34.8                 [ ] Other Labs  [ ] None ordered            Please check or [place] when present:  •  Heart Failure:    [ ] Acute        [ ]  Acute on Chronic        [ ] Chronic         [ ] Diastolic     [ ]  Combined    •  SAJI:     [ ] ATN        [ ]  Renal medullary necrosis       [ ]  Renal cortical necrosis                  [ ] Other pathological Lesion:  •  CKD:  [ ] Stage I   [ ] Stage II  [ ] Stage III    [ ]Stage IV   [ ]  CKD V   [ ]  Other/Unspecified:    •  Abdominal Nutritional Status:   [ ] Malnutrition-See Nutrition note    [ ] Cachexia   [ ]  Other      	  [ ] Supplement ordered:            [ ] Morbid Obesity: BMI >=40         ASSESSMENT/PLAN:      STATUS POST: Lami L2-5 POD1  HV x1 remain.  LE doppler to R/O DVT - pt has been sedentary for weeks, has some LE swelling.  CONTINUE:          [ ] PT WBAT    [ ] DVT PPX-SCD    [ ] Pain Mgt    [ ] Dispo plan-Pending PT evaluation.

## 2017-12-14 NOTE — PHYSICAL THERAPY INITIAL EVALUATION ADULT - DIAGNOSIS, PT EVAL
4F: Impaired joint mobility, motor function, muscle performance, and range of motion and reflex integrity associated with spinal disorders

## 2017-12-14 NOTE — DISCHARGE NOTE ADULT - MEDICATION SUMMARY - MEDICATIONS TO TAKE
I will START or STAY ON the medications listed below when I get home from the hospital:    oxyCODONE-acetaminophen 5 mg-325 mg oral tablet  -- 2 tab(s) by mouth every 4 hours, As needed, Severe Pain  -- Indication: For Pain    oxyCODONE-acetaminophen 5 mg-325 mg oral tablet  -- 1 tab(s) by mouth every 4 hours, As needed, Moderate Pain  -- Indication: For Pain    acetaminophen 325 mg oral tablet  -- 2 tab(s) by mouth every 6 hours, As needed, Mild Pain (1 - 3)  -- Indication: For Pain    valsartan 320 mg oral tablet  -- 1 tab(s) by mouth once a day  -- Indication: For Hypertension    atorvastatin 20 mg oral tablet  -- 1 tab(s) by mouth once a day  -- Indication: For Cholesterol    atenolol 25 mg oral tablet  -- 1 tab(s) by mouth once a day  -- Indication: For Hypertension    amLODIPine 10 mg oral tablet  -- 1 tab(s) by mouth once a day  -- Indication: For Hypertension    hydroCHLOROthiazide 25 mg oral tablet  -- 1 tab(s) by mouth once a day  -- Indication: For Hypertension    Lasix 40 mg oral tablet  -- 1 tab(s) by mouth once a day  -- Indication: For Hypertension    docusate sodium 100 mg oral capsule  -- 1 cap(s) by mouth 3 times a day  -- Indication: For Constipation    senna oral tablet  -- 2 tab(s) by mouth once a day (at bedtime)  -- Indication: For Constipation    cyclobenzaprine 10 mg oral tablet  -- 1 tab(s) by mouth every 8 hours  -- Indication: For Muscle relaxant

## 2017-12-14 NOTE — DISCHARGE NOTE ADULT - MEDICATION SUMMARY - MEDICATIONS TO CHANGE
I will SWITCH the dose or number of times a day I take the medications listed below when I get home from the hospital:    cyclobenzaprine 5 mg oral tablet  -- orally once a day, As Needed I will SWITCH the dose or number of times a day I take the medications listed below when I get home from the hospital:  None

## 2017-12-14 NOTE — PHYSICAL THERAPY INITIAL EVALUATION ADULT - GENERAL OBSERVATIONS, REHAB EVAL
Patient received semi-supine no acute distress +2L O2 NC +hemovac +IV heplock +SCDs. Patient left seated out of bed +call NEVILLE nichols (covering) informed.

## 2017-12-14 NOTE — DISCHARGE NOTE ADULT - CARE PROVIDER_API CALL
Martin Martin), Orthopaedic Surgery  [address]  [address] [place] [address]  Phone: [phone]  Fax: [phone]

## 2017-12-14 NOTE — DISCHARGE NOTE ADULT - PLAN OF CARE
Improvement after surgery. No strenuous activity (bending/twisting), heavy lifting, driving or returning to work until cleared by MD.  Change dressing daily with gauze/tape till post-op day #5, then leave incision open to air.  You may shower post-op day#5, keep incision clean and dry.   Try to have regular bowel movements, take stool softener or laxative if necessary.  May take pepcid or zantac for upset stomach.  May apply ice to affected areas to decrease swelling.  Call to schedule an appt with Dr. Martin for follow up, if you have staples or sutures they will be removed in office.  Contact your doctor if you experience: fever greater than 101.5, chills, chest pain, difficulty breathing, redness or excessive drainage around the incision, other concerns.

## 2017-12-14 NOTE — PHYSICAL THERAPY INITIAL EVALUATION ADULT - PERTINENT HX OF CURRENT PROBLEM, REHAB EVAL
68yo f c/o low back pain since July 2017. 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 Pt. reports after having ACDF performed she began to have difficulty lifting her left foot. Pt. c/o b/l knees giving out on her and buckling when trying to walk.

## 2017-12-14 NOTE — PHYSICAL THERAPY INITIAL EVALUATION ADULT - RANGE OF MOTION EXAMINATION, REHAB EVAL
bilateral upper extremity ROM was WNL (within normal limits)/ROM limited by c/o 4/10 pain in low back/bilateral lower extremity was ROM was WNL (within normal limits)

## 2017-12-14 NOTE — PROGRESS NOTE ADULT - SUBJECTIVE AND OBJECTIVE BOX
S: Patient seen and examined. Pt. doing well, Pain endorsed but controlled. No acute events overnight.    Vital Signs Last 24 Hrs  T(C): 36.6 (14 Dec 2017 04:34), Max: 37.1 (13 Dec 2017 22:15)  T(F): 97.9 (14 Dec 2017 04:34), Max: 98.8 (13 Dec 2017 22:15)  HR: 85 (14 Dec 2017 04:34) (64 - 87)  BP: 115/69 (14 Dec 2017 04:34) (115/69 - 152/78)  BP(mean): --  RR: 16 (14 Dec 2017 04:34) (16 - 18)  SpO2: 96% (14 Dec 2017 04:34) (95% - 99%)    NAD, AOx3, Comfortable  Motor: 5/5 L2-S1 b/l  Sensation: SILT b/l  Pulses: WWP, DP/PT 2+ b/l    Dressing: C/D/I                          11.2   16.1  )-----------( 243      ( 13 Dec 2017 23:24 )             34.8       I&O's Detail    13 Dec 2017 07:01  -  14 Dec 2017 07:00  --------------------------------------------------------  IN:    IV PiggyBack: 50 mL    lactated ringers.: 950 mL    Oral Fluid: 500 mL  Total IN: 1500 mL    OUT:    Accordian: 170 mL    Voided: 1150 mL  Total OUT: 1320 mL    Total NET: 180 mL            A/P:  67y Female s/p  Lami L2-5 on 12/13        -Stable  -Pain Control  -PT/WBAT  -DVT ppx: SCDs  -Advance diet as tolerated  -f/u AM labs  -Dispo: pending    Yg Cheung MD, PGY-2  [phone]

## 2017-12-15 LAB
ANION GAP SERPL CALC-SCNC: 12 MMOL/L — SIGNIFICANT CHANGE UP (ref 5–17)
BASOPHILS NFR BLD AUTO: 0.3 % — SIGNIFICANT CHANGE UP (ref 0–2)
BUN SERPL-MCNC: 13 MG/DL — SIGNIFICANT CHANGE UP (ref 7–23)
CALCIUM SERPL-MCNC: 8.9 MG/DL — SIGNIFICANT CHANGE UP (ref 8.4–10.5)
CHLORIDE SERPL-SCNC: 100 MMOL/L — SIGNIFICANT CHANGE UP (ref 96–108)
CO2 SERPL-SCNC: 27 MMOL/L — SIGNIFICANT CHANGE UP (ref 22–31)
CREAT SERPL-MCNC: 0.76 MG/DL — SIGNIFICANT CHANGE UP (ref 0.5–1.3)
EOSINOPHIL NFR BLD AUTO: 0.7 % — SIGNIFICANT CHANGE UP (ref 0–6)
GLUCOSE SERPL-MCNC: 107 MG/DL — HIGH (ref 70–99)
HCT VFR BLD CALC: 28.4 % — LOW (ref 34.5–45)
HGB BLD-MCNC: 9.4 G/DL — LOW (ref 11.5–15.5)
LYMPHOCYTES # BLD AUTO: 26.7 % — SIGNIFICANT CHANGE UP (ref 13–44)
MCHC RBC-ENTMCNC: 31.5 PG — SIGNIFICANT CHANGE UP (ref 27–34)
MCHC RBC-ENTMCNC: 33.1 G/DL — SIGNIFICANT CHANGE UP (ref 32–36)
MCV RBC AUTO: 95.3 FL — SIGNIFICANT CHANGE UP (ref 80–100)
MONOCYTES NFR BLD AUTO: 9.3 % — SIGNIFICANT CHANGE UP (ref 2–14)
NEUTROPHILS NFR BLD AUTO: 63 % — SIGNIFICANT CHANGE UP (ref 43–77)
PLATELET # BLD AUTO: 212 K/UL — SIGNIFICANT CHANGE UP (ref 150–400)
POTASSIUM SERPL-MCNC: 3.6 MMOL/L — SIGNIFICANT CHANGE UP (ref 3.5–5.3)
POTASSIUM SERPL-SCNC: 3.6 MMOL/L — SIGNIFICANT CHANGE UP (ref 3.5–5.3)
RBC # BLD: 2.98 M/UL — LOW (ref 3.8–5.2)
RBC # FLD: 14.6 % — SIGNIFICANT CHANGE UP (ref 10.3–16.9)
SODIUM SERPL-SCNC: 139 MMOL/L — SIGNIFICANT CHANGE UP (ref 135–145)
WBC # BLD: 14.2 K/UL — HIGH (ref 3.8–10.5)
WBC # FLD AUTO: 14.2 K/UL — HIGH (ref 3.8–10.5)

## 2017-12-15 PROCEDURE — 93970 EXTREMITY STUDY: CPT | Mod: 26

## 2017-12-15 RX ORDER — DOCUSATE SODIUM 100 MG
1 CAPSULE ORAL
Qty: 0 | Refills: 0 | COMMUNITY
Start: 2017-12-15

## 2017-12-15 RX ORDER — SENNA PLUS 8.6 MG/1
2 TABLET ORAL
Qty: 0 | Refills: 0 | COMMUNITY
Start: 2017-12-15

## 2017-12-15 RX ORDER — ACETAMINOPHEN 500 MG
2 TABLET ORAL
Qty: 0 | Refills: 0 | COMMUNITY
Start: 2017-12-15

## 2017-12-15 RX ORDER — ATORVASTATIN CALCIUM 80 MG/1
20 TABLET, FILM COATED ORAL AT BEDTIME
Qty: 0 | Refills: 0 | Status: DISCONTINUED | OUTPATIENT
Start: 2017-12-15 | End: 2017-12-16

## 2017-12-15 RX ORDER — ACETAMINOPHEN 500 MG
2 TABLET ORAL
Qty: 0 | Refills: 0 | COMMUNITY

## 2017-12-15 RX ORDER — LACTULOSE 10 G/15ML
10 SOLUTION ORAL ONCE
Qty: 0 | Refills: 0 | Status: COMPLETED | OUTPATIENT
Start: 2017-12-15 | End: 2017-12-16

## 2017-12-15 RX ORDER — AMLODIPINE BESYLATE 2.5 MG/1
10 TABLET ORAL DAILY
Qty: 0 | Refills: 0 | Status: DISCONTINUED | OUTPATIENT
Start: 2017-12-15 | End: 2017-12-15

## 2017-12-15 RX ORDER — FUROSEMIDE 40 MG
40 TABLET ORAL DAILY
Qty: 0 | Refills: 0 | Status: DISCONTINUED | OUTPATIENT
Start: 2017-12-15 | End: 2017-12-15

## 2017-12-15 RX ORDER — CYCLOBENZAPRINE HYDROCHLORIDE 10 MG/1
0 TABLET, FILM COATED ORAL
Qty: 0 | Refills: 0 | COMMUNITY

## 2017-12-15 RX ORDER — ATENOLOL 25 MG/1
25 TABLET ORAL DAILY
Qty: 0 | Refills: 0 | Status: DISCONTINUED | OUTPATIENT
Start: 2017-12-15 | End: 2017-12-16

## 2017-12-15 RX ORDER — VALSARTAN 80 MG/1
320 TABLET ORAL DAILY
Qty: 0 | Refills: 0 | Status: DISCONTINUED | OUTPATIENT
Start: 2017-12-15 | End: 2017-12-15

## 2017-12-15 RX ORDER — CYCLOBENZAPRINE HYDROCHLORIDE 10 MG/1
1 TABLET, FILM COATED ORAL
Qty: 0 | Refills: 0 | COMMUNITY
Start: 2017-12-15

## 2017-12-15 RX ORDER — HYDROCHLOROTHIAZIDE 25 MG
25 TABLET ORAL DAILY
Qty: 0 | Refills: 0 | Status: DISCONTINUED | OUTPATIENT
Start: 2017-12-15 | End: 2017-12-15

## 2017-12-15 RX ADMIN — Medication 100 MILLIGRAM(S): at 05:37

## 2017-12-15 RX ADMIN — OXYCODONE AND ACETAMINOPHEN 2 TABLET(S): 5; 325 TABLET ORAL at 05:38

## 2017-12-15 RX ADMIN — Medication 100 MILLIGRAM(S): at 21:40

## 2017-12-15 RX ADMIN — CYCLOBENZAPRINE HYDROCHLORIDE 10 MILLIGRAM(S): 10 TABLET, FILM COATED ORAL at 15:39

## 2017-12-15 RX ADMIN — CYCLOBENZAPRINE HYDROCHLORIDE 10 MILLIGRAM(S): 10 TABLET, FILM COATED ORAL at 21:39

## 2017-12-15 RX ADMIN — OXYCODONE AND ACETAMINOPHEN 2 TABLET(S): 5; 325 TABLET ORAL at 12:50

## 2017-12-15 RX ADMIN — OXYCODONE AND ACETAMINOPHEN 2 TABLET(S): 5; 325 TABLET ORAL at 22:30

## 2017-12-15 RX ADMIN — Medication 1 TABLET(S): at 11:52

## 2017-12-15 RX ADMIN — Medication 1 TABLET(S): at 05:37

## 2017-12-15 RX ADMIN — Medication 100 MILLIGRAM(S): at 15:39

## 2017-12-15 RX ADMIN — Medication 1 MILLIGRAM(S): at 11:52

## 2017-12-15 RX ADMIN — Medication 1 TABLET(S): at 21:40

## 2017-12-15 RX ADMIN — OXYCODONE AND ACETAMINOPHEN 2 TABLET(S): 5; 325 TABLET ORAL at 06:30

## 2017-12-15 RX ADMIN — OXYCODONE AND ACETAMINOPHEN 2 TABLET(S): 5; 325 TABLET ORAL at 21:39

## 2017-12-15 RX ADMIN — ATENOLOL 25 MILLIGRAM(S): 25 TABLET ORAL at 21:40

## 2017-12-15 RX ADMIN — OXYCODONE AND ACETAMINOPHEN 2 TABLET(S): 5; 325 TABLET ORAL at 11:50

## 2017-12-15 RX ADMIN — CYCLOBENZAPRINE HYDROCHLORIDE 10 MILLIGRAM(S): 10 TABLET, FILM COATED ORAL at 05:37

## 2017-12-15 RX ADMIN — ATORVASTATIN CALCIUM 20 MILLIGRAM(S): 80 TABLET, FILM COATED ORAL at 21:39

## 2017-12-15 RX ADMIN — SENNA PLUS 2 TABLET(S): 8.6 TABLET ORAL at 21:40

## 2017-12-15 RX ADMIN — Medication 1 TABLET(S): at 15:39

## 2017-12-15 NOTE — PROGRESS NOTE ADULT - SUBJECTIVE AND OBJECTIVE BOX
ORTHO NOTE    [x ] Pt seen/examined.  [x ] Pt without any complaints/in NAD.    [ ] Pt complains of:      ROS: [ ] Fever  [ ] Chills  [ ] CP [ ] SOB [ ] Dysnea  [ ] Palpitations [ ] Cough [ ] N/V/C/D [ ] Paresthia [ ] Other     [ ] ROS  otherwise negative    .    PHYSICAL EXAM:    Vital Signs Last 24 Hrs  T(C): 37.1 (15 Dec 2017 10:20), Max: 37.1 (15 Dec 2017 10:20)  T(F): 98.8 (15 Dec 2017 10:20), Max: 98.8 (15 Dec 2017 10:20)  HR: 100 (15 Dec 2017 10:20) (85 - 101)  BP: 119/64 (15 Dec 2017 10:20) (100/71 - 139/84)  BP(mean): --  RR: 16 (15 Dec 2017 10:20) (16 - 17)  SpO2: 96% (15 Dec 2017 10:20) (94% - 97%)    I&O's Detail    14 Dec 2017 07:01  -  15 Dec 2017 07:00  --------------------------------------------------------  IN:    Oral Fluid: 950 mL  Total IN: 950 mL    OUT:    Accordian: 98 mL    Voided: 300 mL  Total OUT: 398 mL    Total NET: 552 mL      15 Dec 2017 07:01  -  15 Dec 2017 12:08  --------------------------------------------------------  IN:    Oral Fluid: 240 mL  Total IN: 240 mL    OUT:    Voided: 250 mL  Total OUT: 250 mL    Total NET: -10 mL           CAPILLARY BLOOD GLUCOSE                      Neuro:    Lungs:    CV:    ABD:     Ext: Dressing CDI  HV x1  5/5 FHL EHL GS TA  SILT WWP B/L LE    LABS   15 Dec 2017 06:50    139    |  100    |  13     ----------------------------<  107    3.6     |  27     |  0.76     Ca    8.9        15 Dec 2017 06:50                                   9.4    14.2  )-----------( 212      ( 15 Dec 2017 06:50 )             28.4                 [ ] Other Labs  [ ] None ordered            Please check or [place] when present:  •  Heart Failure:    [ ] Acute        [ ]  Acute on Chronic        [ ] Chronic         [ ] Diastolic     [ ]  Combined    •  SAJI:     [ ] ATN        [ ]  Renal medullary necrosis       [ ]  Renal cortical necrosis                  [ ] Other pathological Lesion:  •  CKD:  [ ] Stage I   [ ] Stage II  [ ] Stage III    [ ]Stage IV   [ ]  CKD V   [ ]  Other/Unspecified:    •  Abdominal Nutritional Status:   [ ] Malnutrition-See Nutrition note    [ ] Cachexia   [ ]  Other        [ ] Supplement ordered:            [ ] Morbid Obesity: BMI >=40         ASSESSMENT/PLAN:      STATUS POST: Lami L2-5 POD2  HV x1 remain.  LE doppler pending.    CONTINUE:          [ ] PT    [ ] DVT PPX-SCD    [ ] Pain Mgt    [ ] Dispo plan-[facility]

## 2017-12-15 NOTE — PROGRESS NOTE ADULT - SUBJECTIVE AND OBJECTIVE BOX
S: Patient seen and examined. Pt. doing well, Pain endorsed but controlled. No acute events overnight.    Vital Signs Last 24 Hrs  T(C): 36.6 (14 Dec 2017 04:34), Max: 37.1 (13 Dec 2017 22:15)  T(F): 97.9 (14 Dec 2017 04:34), Max: 98.8 (13 Dec 2017 22:15)  HR: 85 (14 Dec 2017 04:34) (64 - 87)  BP: 115/69 (14 Dec 2017 04:34) (115/69 - 152/78)  BP(mean): --  RR: 16 (14 Dec 2017 04:34) (16 - 18)  SpO2: 96% (14 Dec 2017 04:34) (95% - 99%)    NAD, AOx3, Comfortable  Motor: 5/5 L2-S1 b/l  Sensation: SILT b/l  Pulses: WWP, DP/PT 2+ b/l    Dressing: C/D/I                          11.2   16.1  )-----------( 243      ( 13 Dec 2017 23:24 )             34.8       I&O's Detail    13 Dec 2017 07:01  -  14 Dec 2017 07:00  --------------------------------------------------------  IN:    IV PiggyBack: 50 mL    lactated ringers.: 950 mL    Oral Fluid: 500 mL  Total IN: 1500 mL    OUT:    Accordian: 170 mL    Voided: 1150 mL  Total OUT: 1320 mL    Total NET: 180 mL            A/P:  67y Female s/p  Lami L2-5 on 12/13        -Stable  -Pain Control  -PT/WBAT  -DVT ppx: SCDs  -Advance diet as tolerated  -f/u AM labs  -Dispo: pending    Yg Cheung MD, PGY-2  [phone] S: Patient seen and examined. Pt. doing well, Pain endorsed but controlled. No acute events overnight.    Vital Signs Last 24 Hrs  T(C): 36.6 (15 Dec 2017 05:12), Max: 36.9 (14 Dec 2017 20:30)  T(F): 97.8 (15 Dec 2017 05:12), Max: 98.5 (14 Dec 2017 20:30)  HR: 101 (15 Dec 2017 05:12) (73 - 101)  BP: 139/84 (15 Dec 2017 05:12) (100/71 - 139/84)  BP(mean): --  RR: 17 (15 Dec 2017 05:12) (16 - 17)  SpO2: 94% (15 Dec 2017 05:12) (94% - 97%)    NAD, AOx3, Comfortable  Motor: 5/5 L2-S1 b/l  Sensation: SILT b/l  Pulses: WWP, DP/PT 2+ b/l    Dressing: C/D/I                          11.2   16.1  )-----------( 243      ( 13 Dec 2017 23:24 )             34.8       I&O's Detail    13 Dec 2017 07:01  -  14 Dec 2017 07:00  --------------------------------------------------------  IN:    IV PiggyBack: 50 mL    lactated ringers.: 950 mL    Oral Fluid: 500 mL  Total IN: 1500 mL    OUT:    Accordian: 170 mL    Voided: 1150 mL  Total OUT: 1320 mL    Total NET: 180 mL      14 Dec 2017 07:01  -  15 Dec 2017 06:53  --------------------------------------------------------  IN:    Oral Fluid: 950 mL  Total IN: 950 mL    OUT:    Accordian: 98 mL    Voided: 300 mL  Total OUT: 398 mL    Total NET: 552 mL                  A/P:  67y Female s/p  Lami L2-5 on 12/13        -Stable  -Pain Control  -PT/WBAT  -DVT ppx: SCDs  -Advance diet as tolerated  -f/u AM labs  -f/u b/l LE duplex  -Dispo: pending    Yg Cheung MD, PGY-2  [phone]

## 2017-12-16 VITALS
SYSTOLIC BLOOD PRESSURE: 120 MMHG | TEMPERATURE: 99 F | HEART RATE: 89 BPM | DIASTOLIC BLOOD PRESSURE: 78 MMHG | OXYGEN SATURATION: 94 % | RESPIRATION RATE: 16 BRPM

## 2017-12-16 LAB
ANION GAP SERPL CALC-SCNC: 11 MMOL/L — SIGNIFICANT CHANGE UP (ref 5–17)
BASOPHILS NFR BLD AUTO: 0.2 % — SIGNIFICANT CHANGE UP (ref 0–2)
BUN SERPL-MCNC: 9 MG/DL — SIGNIFICANT CHANGE UP (ref 7–23)
CALCIUM SERPL-MCNC: 9.3 MG/DL — SIGNIFICANT CHANGE UP (ref 8.4–10.5)
CHLORIDE SERPL-SCNC: 98 MMOL/L — SIGNIFICANT CHANGE UP (ref 96–108)
CO2 SERPL-SCNC: 28 MMOL/L — SIGNIFICANT CHANGE UP (ref 22–31)
CREAT SERPL-MCNC: 0.81 MG/DL — SIGNIFICANT CHANGE UP (ref 0.5–1.3)
EOSINOPHIL NFR BLD AUTO: 1.2 % — SIGNIFICANT CHANGE UP (ref 0–6)
GLUCOSE SERPL-MCNC: 104 MG/DL — HIGH (ref 70–99)
HCT VFR BLD CALC: 28.4 % — LOW (ref 34.5–45)
HGB BLD-MCNC: 9.5 G/DL — LOW (ref 11.5–15.5)
LYMPHOCYTES # BLD AUTO: 25.4 % — SIGNIFICANT CHANGE UP (ref 13–44)
MCHC RBC-ENTMCNC: 31.7 PG — SIGNIFICANT CHANGE UP (ref 27–34)
MCHC RBC-ENTMCNC: 33.5 G/DL — SIGNIFICANT CHANGE UP (ref 32–36)
MCV RBC AUTO: 94.7 FL — SIGNIFICANT CHANGE UP (ref 80–100)
MONOCYTES NFR BLD AUTO: 10.8 % — SIGNIFICANT CHANGE UP (ref 2–14)
NEUTROPHILS NFR BLD AUTO: 62.4 % — SIGNIFICANT CHANGE UP (ref 43–77)
PLATELET # BLD AUTO: 223 K/UL — SIGNIFICANT CHANGE UP (ref 150–400)
POTASSIUM SERPL-MCNC: 3.6 MMOL/L — SIGNIFICANT CHANGE UP (ref 3.5–5.3)
POTASSIUM SERPL-SCNC: 3.6 MMOL/L — SIGNIFICANT CHANGE UP (ref 3.5–5.3)
RBC # BLD: 3 M/UL — LOW (ref 3.8–5.2)
RBC # FLD: 14.3 % — SIGNIFICANT CHANGE UP (ref 10.3–16.9)
SODIUM SERPL-SCNC: 137 MMOL/L — SIGNIFICANT CHANGE UP (ref 135–145)
WBC # BLD: 13.4 K/UL — HIGH (ref 3.8–10.5)
WBC # FLD AUTO: 13.4 K/UL — HIGH (ref 3.8–10.5)

## 2017-12-16 RX ADMIN — OXYCODONE AND ACETAMINOPHEN 2 TABLET(S): 5; 325 TABLET ORAL at 03:40

## 2017-12-16 RX ADMIN — LACTULOSE 10 GRAM(S): 10 SOLUTION ORAL at 05:27

## 2017-12-16 RX ADMIN — CYCLOBENZAPRINE HYDROCHLORIDE 10 MILLIGRAM(S): 10 TABLET, FILM COATED ORAL at 05:27

## 2017-12-16 RX ADMIN — Medication 1 TABLET(S): at 05:27

## 2017-12-16 RX ADMIN — OXYCODONE AND ACETAMINOPHEN 2 TABLET(S): 5; 325 TABLET ORAL at 10:22

## 2017-12-16 RX ADMIN — Medication 100 MILLIGRAM(S): at 05:27

## 2017-12-16 RX ADMIN — OXYCODONE AND ACETAMINOPHEN 2 TABLET(S): 5; 325 TABLET ORAL at 02:41

## 2017-12-16 RX ADMIN — OXYCODONE AND ACETAMINOPHEN 2 TABLET(S): 5; 325 TABLET ORAL at 11:22

## 2017-12-16 RX ADMIN — ATENOLOL 25 MILLIGRAM(S): 25 TABLET ORAL at 05:27

## 2017-12-16 NOTE — PROGRESS NOTE ADULT - SUBJECTIVE AND OBJECTIVE BOX
S: Patient seen and examined. Pt. doing well, Pain endorsed but controlled. No acute events overnight.    Vital Signs Last 24 Hrs  T(C): 37.1 (16 Dec 2017 04:37), Max: 37.2 (15 Dec 2017 21:31)  T(F): 98.8 (16 Dec 2017 04:37), Max: 99 (16 Dec 2017 00:23)  HR: 91 (16 Dec 2017 04:37) (91 - 116)  BP: 110/72 (16 Dec 2017 04:37) (110/72 - 139/81)  BP(mean): --  RR: 16 (16 Dec 2017 04:37) (15 - 18)  SpO2: 93% (16 Dec 2017 04:37) (93% - 99%)    NAD, AOx3, Comfortable  Motor: 5/5 L2-S1 b/l  Sensation: SILT b/l  Pulses: WWP  Dressing: C/D/I      A/P:  67y Female s/p  Lami L2-5 on 12/13        -Stable  -Pain Control  -PT/WBAT  -DVT ppx: SCDs  -Advance diet as tolerated  -f/u AM labs  -Dispo: pending

## 2017-12-19 DIAGNOSIS — E78.5 HYPERLIPIDEMIA, UNSPECIFIED: ICD-10-CM

## 2017-12-19 DIAGNOSIS — K25.9 GASTRIC ULCER, UNSPECIFIED AS ACUTE OR CHRONIC, WITHOUT HEMORRHAGE OR PERFORATION: ICD-10-CM

## 2017-12-19 DIAGNOSIS — M51.16 INTERVERTEBRAL DISC DISORDERS WITH RADICULOPATHY, LUMBAR REGION: ICD-10-CM

## 2017-12-19 DIAGNOSIS — M48.061 SPINAL STENOSIS, LUMBAR REGION WITHOUT NEUROGENIC CLAUDICATION: ICD-10-CM

## 2017-12-19 DIAGNOSIS — E66.9 OBESITY, UNSPECIFIED: ICD-10-CM

## 2017-12-19 DIAGNOSIS — M19.90 UNSPECIFIED OSTEOARTHRITIS, UNSPECIFIED SITE: ICD-10-CM

## 2017-12-19 DIAGNOSIS — M50.020 CERVICAL DISC DISORDER WITH MYELOPATHY, MID-CERVICAL REGION, UNSPECIFIED LEVEL: ICD-10-CM

## 2017-12-19 DIAGNOSIS — M48.062 SPINAL STENOSIS, LUMBAR REGION WITH NEUROGENIC CLAUDICATION: ICD-10-CM

## 2017-12-19 DIAGNOSIS — I10 ESSENTIAL (PRIMARY) HYPERTENSION: ICD-10-CM

## 2017-12-26 PROCEDURE — C1889: CPT

## 2017-12-26 PROCEDURE — 93970 EXTREMITY STUDY: CPT

## 2017-12-26 PROCEDURE — 73030 X-RAY EXAM OF SHOULDER: CPT

## 2017-12-26 PROCEDURE — 85025 COMPLETE CBC W/AUTO DIFF WBC: CPT

## 2017-12-26 PROCEDURE — 95940 IONM IN OPERATNG ROOM 15 MIN: CPT

## 2017-12-26 PROCEDURE — 86900 BLOOD TYPING SEROLOGIC ABO: CPT

## 2017-12-26 PROCEDURE — 86901 BLOOD TYPING SEROLOGIC RH(D): CPT

## 2017-12-26 PROCEDURE — 36415 COLL VENOUS BLD VENIPUNCTURE: CPT

## 2017-12-26 PROCEDURE — 80048 BASIC METABOLIC PNL TOTAL CA: CPT

## 2017-12-26 PROCEDURE — 87641 MR-STAPH DNA AMP PROBE: CPT

## 2017-12-26 PROCEDURE — 97161 PT EVAL LOW COMPLEX 20 MIN: CPT

## 2017-12-26 PROCEDURE — 86850 RBC ANTIBODY SCREEN: CPT

## 2017-12-26 PROCEDURE — 97530 THERAPEUTIC ACTIVITIES: CPT

## 2017-12-26 PROCEDURE — 76000 FLUOROSCOPY <1 HR PHYS/QHP: CPT

## 2017-12-26 PROCEDURE — 97116 GAIT TRAINING THERAPY: CPT

## 2018-03-26 ENCOUNTER — APPOINTMENT (OUTPATIENT)
Dept: HEART AND VASCULAR | Facility: CLINIC | Age: 68
End: 2018-03-26
Payer: MEDICARE

## 2018-03-26 VITALS
HEIGHT: 66 IN | BODY MASS INDEX: 35.36 KG/M2 | WEIGHT: 220 LBS | TEMPERATURE: 97.3 F | OXYGEN SATURATION: 99 % | HEART RATE: 66 BPM | DIASTOLIC BLOOD PRESSURE: 78 MMHG | SYSTOLIC BLOOD PRESSURE: 120 MMHG

## 2018-03-26 DIAGNOSIS — Z01.818 ENCOUNTER FOR OTHER PREPROCEDURAL EXAMINATION: ICD-10-CM

## 2018-03-26 PROCEDURE — 93000 ELECTROCARDIOGRAM COMPLETE: CPT

## 2018-03-26 PROCEDURE — 99214 OFFICE O/P EST MOD 30 MIN: CPT | Mod: 25

## 2018-03-26 RX ORDER — HYDROCHLOROTHIAZIDE 25 MG/1
25 TABLET ORAL
Qty: 30 | Refills: 0 | Status: DISCONTINUED | COMMUNITY
Start: 2017-09-06 | End: 2018-03-26

## 2018-03-26 RX ORDER — FUROSEMIDE 40 MG/1
40 TABLET ORAL
Refills: 0 | Status: ACTIVE | COMMUNITY

## 2018-03-26 RX ORDER — ATENOLOL 25 MG/1
25 TABLET ORAL DAILY
Qty: 30 | Refills: 5 | Status: ACTIVE | COMMUNITY
Start: 2018-03-26

## 2018-04-02 RX ORDER — VALSARTAN 80 MG/1
1 TABLET ORAL
Qty: 0 | Refills: 0 | COMMUNITY

## 2018-04-02 NOTE — H&P ADULT - NSHPLABSRESULTS_GEN_ALL_CORE
preop cbc, bmp, coags WNL    CXR - no infiltrate, mild elevation left hemidiaphragm  ekg - in chart, WNL per clearance

## 2018-04-02 NOTE — PATIENT PROFILE ADULT. - TEACHING/LEARNING LEARNING PREFERENCES
individual instruction/verbal instruction/skill demonstration/audio written material/individual instruction/skill demonstration/verbal instruction/audio

## 2018-04-02 NOTE — PATIENT PROFILE ADULT. - PSH
H/O excision of mass  plantar surface of left foot  H/O laminectomy    S/P hysterectomy  partial  S/P ovarian cystectomy    Surgery, elective  cervical spine

## 2018-04-03 ENCOUNTER — OUTPATIENT (OUTPATIENT)
Dept: INPATIENT UNIT | Facility: HOSPITAL | Age: 68
LOS: 1 days | End: 2018-04-03
Payer: MEDICARE

## 2018-04-03 VITALS
WEIGHT: 220.02 LBS | RESPIRATION RATE: 16 BRPM | HEART RATE: 91 BPM | TEMPERATURE: 97 F | HEIGHT: 66 IN | DIASTOLIC BLOOD PRESSURE: 65 MMHG | OXYGEN SATURATION: 97 % | SYSTOLIC BLOOD PRESSURE: 135 MMHG

## 2018-04-03 DIAGNOSIS — Z90.710 ACQUIRED ABSENCE OF BOTH CERVIX AND UTERUS: Chronic | ICD-10-CM

## 2018-04-03 DIAGNOSIS — Z98.890 OTHER SPECIFIED POSTPROCEDURAL STATES: Chronic | ICD-10-CM

## 2018-04-03 DIAGNOSIS — I10 ESSENTIAL (PRIMARY) HYPERTENSION: ICD-10-CM

## 2018-04-03 DIAGNOSIS — K25.9 GASTRIC ULCER, UNSPECIFIED AS ACUTE OR CHRONIC, WITHOUT HEMORRHAGE OR PERFORATION: ICD-10-CM

## 2018-04-03 DIAGNOSIS — Z41.9 ENCOUNTER FOR PROCEDURE FOR PURPOSES OTHER THAN REMEDYING HEALTH STATE, UNSPECIFIED: Chronic | ICD-10-CM

## 2018-04-03 DIAGNOSIS — M48.02 SPINAL STENOSIS, CERVICAL REGION: ICD-10-CM

## 2018-04-03 LAB
MRSA PCR RESULT.: POSITIVE
S AUREUS DNA NOSE QL NAA+PROBE: POSITIVE

## 2018-04-03 PROCEDURE — 86900 BLOOD TYPING SEROLOGIC ABO: CPT

## 2018-04-03 PROCEDURE — 86901 BLOOD TYPING SEROLOGIC RH(D): CPT

## 2018-04-03 PROCEDURE — 86850 RBC ANTIBODY SCREEN: CPT

## 2018-04-03 NOTE — PRE-OP CHECKLIST - 1.
RASH to midlower back, left arm and knees . per patient Dr. Nicole was made aware. Anesthesiologist made aware. RASH to midlower back, left arm and left knee . per patient Dr. Nicole was made aware. Anesthesiologist made aware. RASH to midlower back, left arm and left knee . per patient Dr. Nciole was made aware. Anesthesiologist Dr. Lugo  made aware.

## 2018-04-04 PROBLEM — R06.09 OTHER FORMS OF DYSPNEA: Chronic | Status: ACTIVE | Noted: 2018-04-02

## 2018-04-04 PROBLEM — R21 RASH AND OTHER NONSPECIFIC SKIN ERUPTION: Chronic | Status: ACTIVE | Noted: 2018-04-02

## 2018-04-10 NOTE — OCCUPATIONAL THERAPY INITIAL EVALUATION ADULT - LIGHT TOUCH SENSATION, HEAD/NECK, REHAB EVAL
Patient is calling. She would like to know the results of the MRI she had done.   within normal limits

## 2018-04-16 VITALS
DIASTOLIC BLOOD PRESSURE: 75 MMHG | HEART RATE: 87 BPM | OXYGEN SATURATION: 98 % | TEMPERATURE: 98 F | WEIGHT: 220.02 LBS | RESPIRATION RATE: 16 BRPM | SYSTOLIC BLOOD PRESSURE: 138 MMHG | HEIGHT: 66 IN

## 2018-04-16 NOTE — PATIENT PROFILE ADULT. - PMH
SORENSON (dyspnea on exertion)    Gastric ulcer    HLD (hyperlipidemia)    HTN (hypertension)    Osteoarthritis of knee, unilateral  left knee  Rash of back

## 2018-04-17 ENCOUNTER — INPATIENT (INPATIENT)
Facility: HOSPITAL | Age: 68
LOS: 2 days | Discharge: NURSING, REHAB OR LONG-TERM CARE | DRG: 29 | End: 2018-04-20
Attending: ORTHOPAEDIC SURGERY | Admitting: ORTHOPAEDIC SURGERY
Payer: MEDICARE

## 2018-04-17 DIAGNOSIS — Z98.890 OTHER SPECIFIED POSTPROCEDURAL STATES: Chronic | ICD-10-CM

## 2018-04-17 DIAGNOSIS — Z41.9 ENCOUNTER FOR PROCEDURE FOR PURPOSES OTHER THAN REMEDYING HEALTH STATE, UNSPECIFIED: Chronic | ICD-10-CM

## 2018-04-17 DIAGNOSIS — Z90.710 ACQUIRED ABSENCE OF BOTH CERVIX AND UTERUS: Chronic | ICD-10-CM

## 2018-04-17 DIAGNOSIS — R06.09 OTHER FORMS OF DYSPNEA: ICD-10-CM

## 2018-04-17 DIAGNOSIS — E78.5 HYPERLIPIDEMIA, UNSPECIFIED: ICD-10-CM

## 2018-04-17 DIAGNOSIS — M48.02 SPINAL STENOSIS, CERVICAL REGION: ICD-10-CM

## 2018-04-17 DIAGNOSIS — I10 ESSENTIAL (PRIMARY) HYPERTENSION: ICD-10-CM

## 2018-04-17 DIAGNOSIS — K25.9 GASTRIC ULCER, UNSPECIFIED AS ACUTE OR CHRONIC, WITHOUT HEMORRHAGE OR PERFORATION: ICD-10-CM

## 2018-04-17 LAB
ANION GAP SERPL CALC-SCNC: 11 MMOL/L — SIGNIFICANT CHANGE UP (ref 5–17)
BASE EXCESS BLDA CALC-SCNC: 1.7 MMOL/L — SIGNIFICANT CHANGE UP (ref -2–3)
BASOPHILS NFR BLD AUTO: 0.2 % — SIGNIFICANT CHANGE UP (ref 0–2)
BUN SERPL-MCNC: 10 MG/DL — SIGNIFICANT CHANGE UP (ref 7–23)
CA-I BLDA-SCNC: 1.11 MMOL/L — LOW (ref 1.12–1.3)
CALCIUM SERPL-MCNC: 9.1 MG/DL — SIGNIFICANT CHANGE UP (ref 8.4–10.5)
CHLORIDE SERPL-SCNC: 100 MMOL/L — SIGNIFICANT CHANGE UP (ref 96–108)
CO2 SERPL-SCNC: 26 MMOL/L — SIGNIFICANT CHANGE UP (ref 22–31)
COHGB MFR BLDA: 0.5 % — SIGNIFICANT CHANGE UP
CREAT SERPL-MCNC: 0.77 MG/DL — SIGNIFICANT CHANGE UP (ref 0.5–1.3)
EOSINOPHIL NFR BLD AUTO: 0 % — SIGNIFICANT CHANGE UP (ref 0–6)
GAS PNL BLDA: SIGNIFICANT CHANGE UP
GLUCOSE BLDC GLUCOMTR-MCNC: 140 MG/DL — HIGH (ref 70–99)
GLUCOSE SERPL-MCNC: 182 MG/DL — HIGH (ref 70–99)
HCO3 BLDA-SCNC: 25 MMOL/L — SIGNIFICANT CHANGE UP (ref 21–28)
HCT VFR BLD CALC: 31.2 % — LOW (ref 34.5–45)
HGB BLD-MCNC: 10.6 G/DL — LOW (ref 11.5–15.5)
HGB BLDA-MCNC: 11.5 G/DL — SIGNIFICANT CHANGE UP (ref 11.5–15.5)
LYMPHOCYTES # BLD AUTO: 10.7 % — LOW (ref 13–44)
MCHC RBC-ENTMCNC: 30.1 PG — SIGNIFICANT CHANGE UP (ref 27–34)
MCHC RBC-ENTMCNC: 34 G/DL — SIGNIFICANT CHANGE UP (ref 32–36)
MCV RBC AUTO: 88.6 FL — SIGNIFICANT CHANGE UP (ref 80–100)
METHGB MFR BLDA: 0.6 % — SIGNIFICANT CHANGE UP
MONOCYTES NFR BLD AUTO: 1.5 % — LOW (ref 2–14)
NEUTROPHILS NFR BLD AUTO: 87.6 % — HIGH (ref 43–77)
O2 CT VFR BLDA CALC: 17 ML/DL — SIGNIFICANT CHANGE UP (ref 15–23)
OXYHGB MFR BLDA: 98 % — SIGNIFICANT CHANGE UP (ref 94–100)
PCO2 BLDA: 36 MMHG — SIGNIFICANT CHANGE UP (ref 32–45)
PH BLDA: 7.46 — HIGH (ref 7.35–7.45)
PLATELET # BLD AUTO: 302 K/UL — SIGNIFICANT CHANGE UP (ref 150–400)
PO2 BLDA: 407 MMHG — HIGH (ref 83–108)
POTASSIUM BLDA-SCNC: 4.1 MMOL/L — SIGNIFICANT CHANGE UP (ref 3.5–4.9)
POTASSIUM SERPL-MCNC: 4.1 MMOL/L — SIGNIFICANT CHANGE UP (ref 3.5–5.3)
POTASSIUM SERPL-SCNC: 4.1 MMOL/L — SIGNIFICANT CHANGE UP (ref 3.5–5.3)
RBC # BLD: 3.52 M/UL — LOW (ref 3.8–5.2)
RBC # FLD: 14.4 % — SIGNIFICANT CHANGE UP (ref 10.3–16.9)
SAO2 % BLDA: 99 % — SIGNIFICANT CHANGE UP (ref 95–100)
SODIUM BLDA-SCNC: 138 MMOL/L — SIGNIFICANT CHANGE UP (ref 138–146)
SODIUM SERPL-SCNC: 137 MMOL/L — SIGNIFICANT CHANGE UP (ref 135–145)
WBC # BLD: 12.3 K/UL — HIGH (ref 3.8–10.5)
WBC # FLD AUTO: 12.3 K/UL — HIGH (ref 3.8–10.5)

## 2018-04-17 RX ORDER — AMLODIPINE BESYLATE 2.5 MG/1
10 TABLET ORAL DAILY
Qty: 0 | Refills: 0 | Status: DISCONTINUED | OUTPATIENT
Start: 2018-04-18 | End: 2018-04-20

## 2018-04-17 RX ORDER — ACETAMINOPHEN 500 MG
650 TABLET ORAL EVERY 6 HOURS
Qty: 0 | Refills: 0 | Status: DISCONTINUED | OUTPATIENT
Start: 2018-04-18 | End: 2018-04-20

## 2018-04-17 RX ORDER — ATORVASTATIN CALCIUM 80 MG/1
20 TABLET, FILM COATED ORAL AT BEDTIME
Qty: 0 | Refills: 0 | Status: DISCONTINUED | OUTPATIENT
Start: 2018-04-18 | End: 2018-04-20

## 2018-04-17 RX ORDER — HYDROMORPHONE HYDROCHLORIDE 2 MG/ML
0.5 INJECTION INTRAMUSCULAR; INTRAVENOUS; SUBCUTANEOUS
Qty: 0 | Refills: 0 | Status: DISCONTINUED | OUTPATIENT
Start: 2018-04-17 | End: 2018-04-18

## 2018-04-17 RX ORDER — FUROSEMIDE 40 MG
40 TABLET ORAL DAILY
Qty: 0 | Refills: 0 | Status: DISCONTINUED | OUTPATIENT
Start: 2018-04-18 | End: 2018-04-20

## 2018-04-17 RX ORDER — POTASSIUM CHLORIDE 20 MEQ
10 PACKET (EA) ORAL DAILY
Qty: 0 | Refills: 0 | Status: DISCONTINUED | OUTPATIENT
Start: 2018-04-18 | End: 2018-04-20

## 2018-04-17 RX ORDER — FAMOTIDINE 10 MG/ML
20 INJECTION INTRAVENOUS EVERY 12 HOURS
Qty: 0 | Refills: 0 | Status: DISCONTINUED | OUTPATIENT
Start: 2018-04-18 | End: 2018-04-20

## 2018-04-17 RX ORDER — DOCUSATE SODIUM 100 MG
100 CAPSULE ORAL THREE TIMES A DAY
Qty: 0 | Refills: 0 | Status: DISCONTINUED | OUTPATIENT
Start: 2018-04-18 | End: 2018-04-20

## 2018-04-17 RX ORDER — CYCLOBENZAPRINE HYDROCHLORIDE 10 MG/1
10 TABLET, FILM COATED ORAL THREE TIMES A DAY
Qty: 0 | Refills: 0 | Status: DISCONTINUED | OUTPATIENT
Start: 2018-04-18 | End: 2018-04-20

## 2018-04-17 RX ORDER — SENNA PLUS 8.6 MG/1
2 TABLET ORAL AT BEDTIME
Qty: 0 | Refills: 0 | Status: DISCONTINUED | OUTPATIENT
Start: 2018-04-18 | End: 2018-04-20

## 2018-04-17 RX ORDER — OXYCODONE AND ACETAMINOPHEN 5; 325 MG/1; MG/1
1 TABLET ORAL EVERY 4 HOURS
Qty: 0 | Refills: 0 | Status: DISCONTINUED | OUTPATIENT
Start: 2018-04-17 | End: 2018-04-20

## 2018-04-17 RX ORDER — HYDROMORPHONE HYDROCHLORIDE 2 MG/ML
0.5 INJECTION INTRAMUSCULAR; INTRAVENOUS; SUBCUTANEOUS EVERY 4 HOURS
Qty: 0 | Refills: 0 | Status: DISCONTINUED | OUTPATIENT
Start: 2018-04-17 | End: 2018-04-20

## 2018-04-17 RX ORDER — ATENOLOL 25 MG/1
25 TABLET ORAL DAILY
Qty: 0 | Refills: 0 | Status: DISCONTINUED | OUTPATIENT
Start: 2018-04-18 | End: 2018-04-20

## 2018-04-17 RX ORDER — ONDANSETRON 8 MG/1
4 TABLET, FILM COATED ORAL EVERY 6 HOURS
Qty: 0 | Refills: 0 | Status: DISCONTINUED | OUTPATIENT
Start: 2018-04-17 | End: 2018-04-20

## 2018-04-17 RX ORDER — OXYCODONE AND ACETAMINOPHEN 5; 325 MG/1; MG/1
2 TABLET ORAL EVERY 4 HOURS
Qty: 0 | Refills: 0 | Status: DISCONTINUED | OUTPATIENT
Start: 2018-04-17 | End: 2018-04-20

## 2018-04-17 RX ORDER — CEFAZOLIN SODIUM 1 G
2000 VIAL (EA) INJECTION EVERY 8 HOURS
Qty: 0 | Refills: 0 | Status: COMPLETED | OUTPATIENT
Start: 2018-04-17 | End: 2018-04-18

## 2018-04-17 RX ORDER — SODIUM CHLORIDE 9 MG/ML
1000 INJECTION, SOLUTION INTRAVENOUS
Qty: 0 | Refills: 0 | Status: DISCONTINUED | OUTPATIENT
Start: 2018-04-17 | End: 2018-04-20

## 2018-04-17 RX ADMIN — SODIUM CHLORIDE 125 MILLILITER(S): 9 INJECTION, SOLUTION INTRAVENOUS at 18:45

## 2018-04-17 RX ADMIN — HYDROMORPHONE HYDROCHLORIDE 0.5 MILLIGRAM(S): 2 INJECTION INTRAMUSCULAR; INTRAVENOUS; SUBCUTANEOUS at 20:00

## 2018-04-17 RX ADMIN — HYDROMORPHONE HYDROCHLORIDE 0.5 MILLIGRAM(S): 2 INJECTION INTRAMUSCULAR; INTRAVENOUS; SUBCUTANEOUS at 19:39

## 2018-04-17 RX ADMIN — Medication 100 MILLIGRAM(S): at 21:20

## 2018-04-17 NOTE — H&P PST ADULT - MUSCULOSKELETAL COMMENTS
Sensation intact to bilateral UE distally. Motor Strength 5/5 to /interossei/triceps/biceps/deltoid bilaterally. AIN/PIN intact.

## 2018-04-17 NOTE — BRIEF OPERATIVE NOTE - PROCEDURE
<<-----Click on this checkbox to enter Procedure Cervical laminectomy at one or two levels by posterior approach  04/17/2018  Laminoplasty decompression C3-5  Active  TWONG

## 2018-04-17 NOTE — PROGRESS NOTE ADULT - SUBJECTIVE AND OBJECTIVE BOX
Ortho Post Op Check    Procedure: C3-5 Lami  Surgeon: Veronica  Laterality:    Pt comfortable without complaints, pain controlled  Denies CP, SOB, N/V, numbness/tingling     Vital Signs Last 24 Hrs  T(C): 37.1 (04-17-18 @ 19:30), Max: 37.1 (04-17-18 @ 18:00)  T(F): 98.7 (04-17-18 @ 19:30), Max: 98.7 (04-17-18 @ 18:00)  HR: 100 (04-17-18 @ 21:00) (100 - 106)  BP: 104/59 (04-17-18 @ 21:00) (104/59 - 133/65)  BP(mean): 82 (04-17-18 @ 21:00) (74 - 97)  RR: 13 (04-17-18 @ 21:00) (13 - 19)  SpO2: 98% (04-17-18 @ 21:00) (97% - 100%)  AVSS    NAD, non labored respirations  Affected extremity:          Dressing: clean/dry/intact          Drains: 1         Sensation: [x ] intact to light touch  [ ] decreased                              Vascular: [ x] warm well perfused; capillary refill <3 seconds          Motor exam: [ x]         [x ] Upper extremity                   Sophia (c5)   Bi(c5/6)   WE(c6)   EE(c7)    (c8)                                                R           4             4             4             4        3                                               L            4             4             4             4        3         [ ] Lower extremity                   IP(L2)     Q(L3)     TA(L4)     EHL(L5)     GS(s1)                                                 R          5           5          5            5              5                                               L           5           5         5             5              5                            10.6   12.3  )-----------( 302      ( 17 Apr 2018 18:18 )             31.2   17 Apr 2018 18:18    137    |  100    |  10     ----------------------------<  182    4.1     |  26     |  0.77     Ca    9.1        17 Apr 2018 18:18        Post-op X-Ray: no implants    A/P: 67yFemale POD#0 s/p C3-5 Lami  - Stable  - Pain Control  - DVT ppx: SCDs  - Post op abx: Ancef  - PT, WBS: WBAT    Ortho Pager [phone]

## 2018-04-17 NOTE — H&P PST ADULT - PROBLEM SELECTOR PLAN 1
Admit to Orthopaedic Service.  Presents today for elective Cervical Lami/Fusion C3-C5, Foraminotomy Left C6-C7  Pt medically stable and cleared for procedure today by Dr. Mccarthy

## 2018-04-17 NOTE — H&P PST ADULT - NS MD HP PULSE POSTERIOR
Anesthesia Evaluation     . Pt has had prior anesthetic. Type: General    No history of anesthetic complications          ROS/MED HX    ENT/Pulmonary:  - neg pulmonary ROS     Neurologic:  - neg neurologic ROS     Cardiovascular:     (+) Dyslipidemia, hypertension----. : . . . :. .       METS/Exercise Tolerance:  >4 METS   Hematologic:  - neg hematologic  ROS       Musculoskeletal:  - neg musculoskeletal ROS       GI/Hepatic: Comment: Appendicitis        Renal/Genitourinary:  - ROS Renal section negative       Endo:  - neg endo ROS       Psychiatric:  - neg psychiatric ROS       Infectious Disease:  - neg infectious disease ROS       Malignancy:      - no malignancy   Other:    - neg other ROS                 Physical Exam  Normal systems: cardiovascular and pulmonary    Airway   Mallampati: III  TM distance: >3 FB  Neck ROM: full    Dental   (+) caps    Cardiovascular       Pulmonary                     Anesthesia Plan      History & Physical Review      ASA Status:  2 emergent.    NPO Status:  > 2 hours    Plan for General, RSI, ETT and Periph. Nerve Block for postop pain with Intravenous and Propofol induction. Maintenance will be Balanced.    PONV prophylaxis:  Ondansetron (or other 5HT-3) and Dexamethasone or Solumedrol  Additional equipment: Videolaryngoscope NOTE: Postoperatively bilateral TAP block discussed with patient and wife.  They agree to proceed.      Postoperative Care  Postoperative pain management:  IV analgesics, Oral pain medications and Peripheral nerve block (Single Shot).      Consents  Anesthetic plan, risks, benefits and alternatives discussed with:  Patient and Spouse..                          .  
left normal/right normal

## 2018-04-17 NOTE — H&P PST ADULT - HISTORY OF PRESENT ILLNESS
67F c/o neck pain x chronic. Pt has hx of two prior spine cases, cervical spine in August 2017 and lumbar in December 2017. Pt states her neck pain is localized; pt endorses constant numbness/tingling and intermittent weakness of bilateral upper extremities. Pt takes Tylenol as needed for pain control. Pt ambulates with a walker and wheelchair since July 2017. Denies DVT hx; denies CP, SOB, N/V, tactile fevers today.    Present for elective laminectomy/fusion C3-C7 with left foraminotomy C6-C7.

## 2018-04-18 ENCOUNTER — TRANSCRIPTION ENCOUNTER (OUTPATIENT)
Age: 68
End: 2018-04-18

## 2018-04-18 LAB
ANION GAP SERPL CALC-SCNC: 11 MMOL/L — SIGNIFICANT CHANGE UP (ref 5–17)
BASOPHILS NFR BLD AUTO: 0.1 % — SIGNIFICANT CHANGE UP (ref 0–2)
BUN SERPL-MCNC: 8 MG/DL — SIGNIFICANT CHANGE UP (ref 7–23)
CALCIUM SERPL-MCNC: 9 MG/DL — SIGNIFICANT CHANGE UP (ref 8.4–10.5)
CHLORIDE SERPL-SCNC: 100 MMOL/L — SIGNIFICANT CHANGE UP (ref 96–108)
CO2 SERPL-SCNC: 27 MMOL/L — SIGNIFICANT CHANGE UP (ref 22–31)
CREAT SERPL-MCNC: 0.64 MG/DL — SIGNIFICANT CHANGE UP (ref 0.5–1.3)
EOSINOPHIL NFR BLD AUTO: 0 % — SIGNIFICANT CHANGE UP (ref 0–6)
GLUCOSE SERPL-MCNC: 135 MG/DL — HIGH (ref 70–99)
HCT VFR BLD CALC: 32.2 % — LOW (ref 34.5–45)
HGB BLD-MCNC: 10.7 G/DL — LOW (ref 11.5–15.5)
LYMPHOCYTES # BLD AUTO: 12.2 % — LOW (ref 13–44)
MCHC RBC-ENTMCNC: 29.4 PG — SIGNIFICANT CHANGE UP (ref 27–34)
MCHC RBC-ENTMCNC: 33.2 G/DL — SIGNIFICANT CHANGE UP (ref 32–36)
MCV RBC AUTO: 88.5 FL — SIGNIFICANT CHANGE UP (ref 80–100)
MONOCYTES NFR BLD AUTO: 5.9 % — SIGNIFICANT CHANGE UP (ref 2–14)
NEUTROPHILS NFR BLD AUTO: 81.8 % — HIGH (ref 43–77)
PLATELET # BLD AUTO: 318 K/UL — SIGNIFICANT CHANGE UP (ref 150–400)
POTASSIUM SERPL-MCNC: 4.2 MMOL/L — SIGNIFICANT CHANGE UP (ref 3.5–5.3)
POTASSIUM SERPL-SCNC: 4.2 MMOL/L — SIGNIFICANT CHANGE UP (ref 3.5–5.3)
RBC # BLD: 3.64 M/UL — LOW (ref 3.8–5.2)
RBC # FLD: 14.3 % — SIGNIFICANT CHANGE UP (ref 10.3–16.9)
SODIUM SERPL-SCNC: 138 MMOL/L — SIGNIFICANT CHANGE UP (ref 135–145)
WBC # BLD: 14.4 K/UL — HIGH (ref 3.8–10.5)
WBC # FLD AUTO: 14.4 K/UL — HIGH (ref 3.8–10.5)

## 2018-04-18 RX ADMIN — SODIUM CHLORIDE 125 MILLILITER(S): 9 INJECTION, SOLUTION INTRAVENOUS at 03:00

## 2018-04-18 RX ADMIN — Medication 100 MILLIGRAM(S): at 15:47

## 2018-04-18 RX ADMIN — OXYCODONE AND ACETAMINOPHEN 2 TABLET(S): 5; 325 TABLET ORAL at 21:29

## 2018-04-18 RX ADMIN — Medication 100 MILLIGRAM(S): at 05:14

## 2018-04-18 RX ADMIN — HYDROMORPHONE HYDROCHLORIDE 0.5 MILLIGRAM(S): 2 INJECTION INTRAMUSCULAR; INTRAVENOUS; SUBCUTANEOUS at 07:03

## 2018-04-18 RX ADMIN — AMLODIPINE BESYLATE 10 MILLIGRAM(S): 2.5 TABLET ORAL at 10:42

## 2018-04-18 RX ADMIN — HYDROMORPHONE HYDROCHLORIDE 0.5 MILLIGRAM(S): 2 INJECTION INTRAMUSCULAR; INTRAVENOUS; SUBCUTANEOUS at 03:15

## 2018-04-18 RX ADMIN — ATENOLOL 25 MILLIGRAM(S): 25 TABLET ORAL at 10:43

## 2018-04-18 RX ADMIN — Medication 40 MILLIGRAM(S): at 11:21

## 2018-04-18 RX ADMIN — HYDROMORPHONE HYDROCHLORIDE 0.5 MILLIGRAM(S): 2 INJECTION INTRAMUSCULAR; INTRAVENOUS; SUBCUTANEOUS at 06:51

## 2018-04-18 RX ADMIN — OXYCODONE AND ACETAMINOPHEN 2 TABLET(S): 5; 325 TABLET ORAL at 22:29

## 2018-04-18 RX ADMIN — HYDROMORPHONE HYDROCHLORIDE 0.5 MILLIGRAM(S): 2 INJECTION INTRAMUSCULAR; INTRAVENOUS; SUBCUTANEOUS at 03:26

## 2018-04-18 RX ADMIN — SENNA PLUS 2 TABLET(S): 8.6 TABLET ORAL at 21:29

## 2018-04-18 RX ADMIN — Medication 10 MILLIEQUIVALENT(S): at 11:21

## 2018-04-18 RX ADMIN — FAMOTIDINE 20 MILLIGRAM(S): 10 INJECTION INTRAVENOUS at 17:29

## 2018-04-18 RX ADMIN — ATORVASTATIN CALCIUM 20 MILLIGRAM(S): 80 TABLET, FILM COATED ORAL at 21:29

## 2018-04-18 RX ADMIN — Medication 1 TABLET(S): at 11:21

## 2018-04-18 RX ADMIN — OXYCODONE AND ACETAMINOPHEN 2 TABLET(S): 5; 325 TABLET ORAL at 16:28

## 2018-04-18 RX ADMIN — Medication 100 MILLIGRAM(S): at 21:29

## 2018-04-18 NOTE — DISCHARGE NOTE ADULT - PLAN OF CARE
Improvement in pain and ambulation after surgery No strenuous activity (bending/twisting), heavy lifting, driving or returning to work until cleared by MD.  You may shower. Remove dressing daily before shower, pat the area dry gently then reapply dry gauze dressing x 5 days, then leave incision open to air.   Try to have regular bowel movements, take stool softener or laxative if necessary.  May take pepcid or zantac for upset stomach.  Ice affected areas to decrease swelling.  Call to schedule an appt with Dr. Martin for follow up, if you have staples or sutures they will be removed in office.  Contact your doctor if you experience: fever greater than 101.5, chills, chest pain, difficulty breathing, redness or excessive drainage around the incision, other concerns. Wear hard collar at all times except when bathing or dressing.  No strenuous activity (bending/twisting), heavy lifting, driving or returning to work until cleared by MD.  You may shower. Remove dressing daily before shower, pat the area dry gently then reapply dry gauze dressing x 5 days, then leave incision open to air.   Try to have regular bowel movements, take stool softener or laxative if necessary.  May take pepcid or zantac for upset stomach.  Ice affected areas to decrease swelling.  Call to schedule an appt with Dr. Martin for follow up, if you have staples or sutures they will be removed in office.  Contact your doctor if you experience: fever greater than 101.5, chills, chest pain, difficulty breathing, redness or excessive drainage around the incision, other concerns.

## 2018-04-18 NOTE — OCCUPATIONAL THERAPY INITIAL EVALUATION ADULT - IMPAIRED TRANSFERS: SIT/STAND, REHAB EVAL
pain/decreased ROM/decreased sensation/impaired sensory feedback/decreased strength/impaired balance/abnormal muscle tone

## 2018-04-18 NOTE — PHYSICAL THERAPY INITIAL EVALUATION ADULT - ADDITIONAL COMMENTS
Pt lives in walk up apartment, 12 stairs to enter building with one handrail, pt lives on first floor. Pt was previously ambulating ~ 50ft with RW. Pt received assistance from  and son to perform stair climbing.

## 2018-04-18 NOTE — PHYSICAL THERAPY INITIAL EVALUATION ADULT - GENERAL OBSERVATIONS, REHAB EVAL
Pt received seated EOB, cervical collar, drain and TERRANCE exiting posterior dressing, ALFREDA WALTERS, NEVILLE barrios,  present, NAD. Pt received seated EOB, cervical collar, drain and TERRANCE exiting posterior dressing clean, dry and intact pre and post treatment, sophie GANT IV, RN Eileen,  present, NAD.

## 2018-04-18 NOTE — PROGRESS NOTE ADULT - SUBJECTIVE AND OBJECTIVE BOX
SUBJECTIVE: incisional pain controlled    OBJECTIVE: comfortable. Cervical collar in place    Vital Signs Last 24 Hrs  T(C): 36.7 (18 Apr 2018 14:33), Max: 37.1 (17 Apr 2018 18:00)  T(F): 98 (18 Apr 2018 14:33), Max: 98.7 (17 Apr 2018 18:00)  HR: 86 (18 Apr 2018 16:09) (76 - 106)  BP: 116/56 (18 Apr 2018 16:09) (104/59 - 136/64)  BP(mean): 68 (18 Apr 2018 12:33) (68 - 97)  RR: 16 (18 Apr 2018 14:33) (10 - 19)  SpO2: 99% (18 Apr 2018 16:09) (97% - 100%)    Affected extremity:          Dressing:  clean/dry/intact           Drains: 1 drains-shift output: 40 cc         Sensation:           Upper extremity                ax        mc           m          u          r                                                         R          +           +             +           +          +                                               L           +           +             +           +          +         Lower extremeity             sp         dp         saph       zuñiga         tibial                                                R          +           +             +           +          +                                               L           +           +             +           +          +         Motor exam:          Upper extremity              Bi(c5)  WE(c6)  EE(c7)   FF(c8)                                                R         5/5        5/5        5/5       5/5                                               L          5/5        4/5        4/5       4/5         Lower extremeity          HF(l2)   KE(l3)    TA(l4)   EHL(l5)  GS(s1)                                                 R        4/5        5/5        5/5       5/5         5/5                                               L         4/5        5/5       5/5       5/5          5/5                                                         LABS:                        10.7   14.4  )-----------( 318      ( 18 Apr 2018 03:27 )             32.2     04-18    138  |  100  |  8   ----------------------------<  135<H>  4.2   |  27  |  0.64    Ca    9.0      18 Apr 2018 03:27            A/P :  67y Female s/p C3-C5 laminectomy  comfortable  Up with PT today, using walker  strength UE and LE improved  monitor drains  SCD DVT prophylaxis  d/c barrios  OOB  dispo planning evaluation for nursing facility/rehab

## 2018-04-18 NOTE — OCCUPATIONAL THERAPY INITIAL EVALUATION ADULT - ADDITIONAL COMMENTS
Patient reports ambulating short distance with RW and assistance, also has rollator and SC at home. Patient states requiring assistance from daughter and/or son with Activities of Daily Living 2/2 h/o OA (limited bilateral upper extremities ROM), overall impaired balance, strength and endurance.

## 2018-04-18 NOTE — OCCUPATIONAL THERAPY INITIAL EVALUATION ADULT - RANGE OF MOTION EXAMINATION, UPPER EXTREMITY
bilateral shoulder flexion AROM 90 degrees (AAROM 120 degrees limited by pain), right elbow/wrist/digits flex/ext AROM WFL, left elbow flexion/ AROM WFL, left elbow extension lacking ~15 degrees 2/2 OA, limited left digits MCP/PIP extension (75%), right thumb opposition to all digits AROM WFL, left thumb opposition AROM to digits 2-3 only (90% full AROM to digits 4-5).

## 2018-04-18 NOTE — PHYSICAL THERAPY INITIAL EVALUATION ADULT - DID THE PATIENT HAVE SURGERY?
yes/Cervical laminectomy at one or two levels by posterior approach  Laminoplasty decompression C3-5

## 2018-04-18 NOTE — DISCHARGE NOTE ADULT - HOSPITAL COURSE
Admit  OR  Periop Antibx  DVT ppx  PT   Pain mgt Admit  OR Posterior Cervical Lami/Decompression C3-5 4/17/18.  Periop Antibx  DVT ppx  PT   Pain mgt

## 2018-04-18 NOTE — PHYSICAL THERAPY INITIAL EVALUATION ADULT - IMPAIRMENTS FOUND, PT EVAL
posture/aerobic capacity/endurance/fine motor/muscle strength/ergonomics and body mechanics/gait, locomotion, and balance

## 2018-04-18 NOTE — DISCHARGE NOTE ADULT - CARE PLAN
Principal Discharge DX:	Cervical stenosis of spine  Goal:	Improvement in pain and ambulation after surgery  Assessment and plan of treatment:	No strenuous activity (bending/twisting), heavy lifting, driving or returning to work until cleared by MD.  You may shower. Remove dressing daily before shower, pat the area dry gently then reapply dry gauze dressing x 5 days, then leave incision open to air.   Try to have regular bowel movements, take stool softener or laxative if necessary.  May take pepcid or zantac for upset stomach.  Ice affected areas to decrease swelling.  Call to schedule an appt with Dr. Martin for follow up, if you have staples or sutures they will be removed in office.  Contact your doctor if you experience: fever greater than 101.5, chills, chest pain, difficulty breathing, redness or excessive drainage around the incision, other concerns. Principal Discharge DX:	Cervical stenosis of spine  Goal:	Improvement in pain and ambulation after surgery  Assessment and plan of treatment:	Wear hard collar at all times except when bathing or dressing.  No strenuous activity (bending/twisting), heavy lifting, driving or returning to work until cleared by MD.  You may shower. Remove dressing daily before shower, pat the area dry gently then reapply dry gauze dressing x 5 days, then leave incision open to air.   Try to have regular bowel movements, take stool softener or laxative if necessary.  May take pepcid or zantac for upset stomach.  Ice affected areas to decrease swelling.  Call to schedule an appt with Dr. Martin for follow up, if you have staples or sutures they will be removed in office.  Contact your doctor if you experience: fever greater than 101.5, chills, chest pain, difficulty breathing, redness or excessive drainage around the incision, other concerns.

## 2018-04-18 NOTE — PHYSICAL THERAPY INITIAL EVALUATION ADULT - IMPAIRED TRANSFERS: SIT/STAND, REHAB EVAL
Pt unable to fully attain standing, bilateral knees appeared to buckle, and unable to safely return to EOB, sliding off EOB/pain/decreased sensation/decreased strength/impaired motor control/impaired balance

## 2018-04-18 NOTE — PHYSICAL THERAPY INITIAL EVALUATION ADULT - PLANNED THERAPY INTERVENTIONS, PT EVAL
transfer training/neuromuscular re-education/postural re-education/gait training/motor coordination training/strengthening/balance training/bed mobility training/stretching

## 2018-04-18 NOTE — PHYSICAL THERAPY INITIAL EVALUATION ADULT - RANGE OF MOTION EXAMINATION, REHAB EVAL
WFL except bilateral shoulder elevation ~ 75 degrees. Right knee extension ~ 30% limited. Pt reported ROM restrictions occurred prior to surgery and pain with MMT was due to OA. Pt fatigued quickly with MMT. Right ankle dorsiflexion ~50% limited.

## 2018-04-18 NOTE — PHYSICAL THERAPY INITIAL EVALUATION ADULT - MODALITIES TREATMENT COMMENTS
Pt reports sensation impaired bilaterally entire lower body below C spine. Bilateral finger to thumb opposition impaired. Proprioception impaired.

## 2018-04-18 NOTE — PHYSICAL THERAPY INITIAL EVALUATION ADULT - PERTINENT HX OF CURRENT PROBLEM, REHAB EVAL
As per chart: 67F c/o neck pain x chronic. Pt has hx of two prior spine cases, cervical spine in August 2017 and lumbar in December 2017. Pt states her neck pain is localized; pt endorses constant numbness/tingling and intermittent weakness of bilateral upper extremities. Pt takes Tylenol as needed for pain control.. Cervical myelopathy, cervical radiculopathy, Cervical spinal stenosis. Previous spinal surgery. OA.

## 2018-04-18 NOTE — OCCUPATIONAL THERAPY INITIAL EVALUATION ADULT - GENERAL OBSERVATIONS, REHAB EVAL
Right hand dominant. Patient cleared for Occupational Therapy by NEVILLE Chaves, patient received supine in semi-fabian position in non-acute distress. +EKG, +IV heplock, +posterior neck dressing C/D/I with +Zenobia wound vac, +hemovac drainX1, +hard collar in place, +Avina, + bilateral sequential compression devices. Patient reports right hip and neck pain rated 7/10, NEVILLE Chaves aware.

## 2018-04-18 NOTE — DISCHARGE NOTE ADULT - CARE PROVIDER_API CALL
Martin Martin), Orthopaedic Surgery  [address]  [address] [place] [address]  Phone: [phone]  Fax: [phone]

## 2018-04-18 NOTE — DISCHARGE NOTE ADULT - PATIENT PORTAL LINK FT
You can access the [facility] Patient Portal, offered by [facility], by registering with the following website: [email or web address]

## 2018-04-18 NOTE — OCCUPATIONAL THERAPY INITIAL EVALUATION ADULT - IMPAIRMENTS CONTRIBUTING IMPAIRED BED MOBILITY, REHAB EVAL
pain/decreased ROM/impaired motor control/abnormal muscle tone/decreased sensation/impaired sensory feedback/decreased strength/impaired balance/impaired coordination

## 2018-04-18 NOTE — OCCUPATIONAL THERAPY INITIAL EVALUATION ADULT - PERTINENT HX OF CURRENT PROBLEM, REHAB EVAL
67F c/o neck pain x chronic. Pt has hx of two prior spine cases, cervical spine in August 2017 and lumbar in December 2017. Pt states her neck pain is localized; pt endorses constant numbness/tingling and intermittent weakness of bilateral upper extremities. Pt takes Tylenol as needed for pain control. Pt ambulates with a walker and wheelchair since July 2017. S/P Cervical laminectomy at one or two levels by posterior approach Laminoplasty decompression C3-5  04/17/2018.

## 2018-04-18 NOTE — OCCUPATIONAL THERAPY INITIAL EVALUATION ADULT - PLANNED THERAPY INTERVENTIONS, OT EVAL
motor coordination training/transfer training/balance training/ROM/bed mobility training/fine motor coordination training/parent/caregiver training.../strengthening/neuromuscular re-education/ADL retraining

## 2018-04-18 NOTE — DISCHARGE NOTE ADULT - MEDICATION SUMMARY - MEDICATIONS TO TAKE
I will START or STAY ON the medications listed below when I get home from the hospital:    oxyCODONE-acetaminophen 5 mg-325 mg oral tablet  -- 1 tab(s) by mouth every 4 hours, As needed, Moderate Pain  -- Indication: For Pain    oxyCODONE-acetaminophen 5 mg-325 mg oral tablet  -- 2 tab(s) by mouth every 4 hours, As needed, Severe Pain  -- Indication: For Pain    acetaminophen 325 mg oral tablet  -- 2 tab(s) by mouth every 6 hours, As needed, For Temp greater than 38 C (100.4 F)  -- Indication: For Fever    atorvastatin 20 mg oral tablet  -- 1 tab(s) by mouth once a day  -- Indication: For Cholesterol    atenolol 25 mg oral tablet  -- 1 tab(s) by mouth once a day  -- Indication: For Hypertension    amLODIPine 10 mg oral tablet  -- 1 tab(s) by mouth once a day  -- Indication: For Hypertension    Lasix 40 mg oral tablet  -- 1 tab(s) by mouth once a day  -- Indication: For Hypertension    docusate sodium 100 mg oral capsule  -- 1 cap(s) by mouth 3 times a day  -- Indication: For Constipation    senna oral tablet  -- 2 tab(s) by mouth once a day (at bedtime)  -- Indication: For Constipation    potassium chloride 10 mEq oral tablet, extended release  -- orally once a day  -- Indication: For Hypokalemia    cyclobenzaprine 10 mg oral tablet  -- 1 tab(s) by mouth every 8 hours  -- Indication: For Muscle spasm

## 2018-04-18 NOTE — PROGRESS NOTE ADULT - SUBJECTIVE AND OBJECTIVE BOX
ORTHO NOTE    [x ] Pt seen/examined.  [ ] Pt without any complaints/in NAD.    [x ] Pt complains of: low back surgical pain non radiating, no numbness or tingling. B/L UE weakness prior to surgery persists.      ROS: [ ] Fever  [ ] Chills  [ ] CP [ ] SOB [ ] Dysnea  [ ] Palpitations [ ] Cough [ ] N/V/C/D [ ] Paresthia [ ] Other     [ ] ROS  otherwise negative    .    PHYSICAL EXAM:    Vital Signs Last 24 Hrs  T(C): 36.7 (18 Apr 2018 14:33), Max: 37.1 (17 Apr 2018 18:00)  T(F): 98 (18 Apr 2018 14:33), Max: 98.7 (17 Apr 2018 18:00)  HR: 77 (18 Apr 2018 14:33) (77 - 106)  BP: 112/53 (18 Apr 2018 14:33) (104/59 - 136/64)  BP(mean): 68 (18 Apr 2018 12:33) (68 - 97)  RR: 16 (18 Apr 2018 14:33) (10 - 19)  SpO2: 98% (18 Apr 2018 14:33) (97% - 100%)    I&O's Detail    17 Apr 2018 07:01  -  18 Apr 2018 07:00  --------------------------------------------------------  IN:    IV PiggyBack: 50 mL    lactated ringers.: 1625 mL    Oral Fluid: 200 mL  Total IN: 1875 mL    OUT:    Drain: 55 mL    Indwelling Catheter - Urethral: 2210 mL  Total OUT: 2265 mL    Total NET: -390 mL      18 Apr 2018 07:01  -  18 Apr 2018 15:31  --------------------------------------------------------  IN:    lactated ringers.: 750 mL    Oral Fluid: 200 mL  Total IN: 950 mL    OUT:    Drain: 45 mL    Indwelling Catheter - Urethral: 950 mL  Total OUT: 995 mL    Total NET: -45 mL           CAPILLARY BLOOD GLUCOSE      POCT Blood Glucose.: 140 mg/dL (17 Apr 2018 16:46)                  Neuro: NAD AO x3    Lungs:    CV:    ABD: Kailyn    Ext: Dressing CDI  HV x1  5/5 FHL EHL GS TA  SILT L2-S1 WWP B/L LE    LABS   18 Apr 2018 03:27    138    |  100    |  8      ----------------------------<  135    4.2     |  27     |  0.64     Ca    9.0        18 Apr 2018 03:27                                   10.7   14.4  )-----------( 318      ( 18 Apr 2018 03:27 )             32.2                 [ ] Other Labs  [ ] None ordered            Please check or [place] when present:  •  Heart Failure:    [ ] Acute        [ ]  Acute on Chronic        [ ] Chronic         [ ] Diastolic     [ ]  Combined    •  SAJI:     [ ] ATN        [ ]  Renal medullary necrosis       [ ]  Renal cortical necrosis                  [ ] Other pathological Lesion:  •  CKD:  [ ] Stage I   [ ] Stage II  [ ] Stage III    [ ]Stage IV   [ ]  CKD V   [ ]  Other/Unspecified:    •  Abdominal Nutritional Status:   [ ] Malnutrition-See Nutrition note    [ ] Cachexia   [ ]  Other        [ ] Supplement ordered:            [ ] Morbid Obesity: BMI >=40         ASSESSMENT/PLAN:      STATUS POST: Posterior cervical Lami/Decompression C3-5 POD 1  HV x1 remain.  TOV tomorrow morning.    CONTINUE:          [ ] PT WBAT    [ ] DVT PPX- SCD    [ ] Pain Mgt    [ ] Dispo plan-[facility]

## 2018-04-18 NOTE — PHYSICAL THERAPY INITIAL EVALUATION ADULT - DISCHARGE DISPOSITION, PT EVAL
Acute rehab, pt would benefit from 3 hours of combined therapy pending continued functional progress.

## 2018-04-19 LAB
ANION GAP SERPL CALC-SCNC: 9 MMOL/L — SIGNIFICANT CHANGE UP (ref 5–17)
BASOPHILS NFR BLD AUTO: 0.3 % — SIGNIFICANT CHANGE UP (ref 0–2)
BUN SERPL-MCNC: 6 MG/DL — LOW (ref 7–23)
CALCIUM SERPL-MCNC: 8.8 MG/DL — SIGNIFICANT CHANGE UP (ref 8.4–10.5)
CHLORIDE SERPL-SCNC: 100 MMOL/L — SIGNIFICANT CHANGE UP (ref 96–108)
CO2 SERPL-SCNC: 30 MMOL/L — SIGNIFICANT CHANGE UP (ref 22–31)
CREAT SERPL-MCNC: 0.72 MG/DL — SIGNIFICANT CHANGE UP (ref 0.5–1.3)
EOSINOPHIL NFR BLD AUTO: 0.5 % — SIGNIFICANT CHANGE UP (ref 0–6)
GLUCOSE SERPL-MCNC: 101 MG/DL — HIGH (ref 70–99)
HCT VFR BLD CALC: 30.1 % — LOW (ref 34.5–45)
HGB BLD-MCNC: 9.9 G/DL — LOW (ref 11.5–15.5)
LYMPHOCYTES # BLD AUTO: 29.5 % — SIGNIFICANT CHANGE UP (ref 13–44)
MCHC RBC-ENTMCNC: 29.9 PG — SIGNIFICANT CHANGE UP (ref 27–34)
MCHC RBC-ENTMCNC: 32.9 G/DL — SIGNIFICANT CHANGE UP (ref 32–36)
MCV RBC AUTO: 90.9 FL — SIGNIFICANT CHANGE UP (ref 80–100)
MONOCYTES NFR BLD AUTO: 7.7 % — SIGNIFICANT CHANGE UP (ref 2–14)
NEUTROPHILS NFR BLD AUTO: 62 % — SIGNIFICANT CHANGE UP (ref 43–77)
PLATELET # BLD AUTO: 269 K/UL — SIGNIFICANT CHANGE UP (ref 150–400)
POTASSIUM SERPL-MCNC: 3.4 MMOL/L — LOW (ref 3.5–5.3)
POTASSIUM SERPL-SCNC: 3.4 MMOL/L — LOW (ref 3.5–5.3)
RBC # BLD: 3.31 M/UL — LOW (ref 3.8–5.2)
RBC # FLD: 14.5 % — SIGNIFICANT CHANGE UP (ref 10.3–16.9)
SODIUM SERPL-SCNC: 139 MMOL/L — SIGNIFICANT CHANGE UP (ref 135–145)
WBC # BLD: 15.8 K/UL — HIGH (ref 3.8–10.5)
WBC # FLD AUTO: 15.8 K/UL — HIGH (ref 3.8–10.5)

## 2018-04-19 PROCEDURE — 72040 X-RAY EXAM NECK SPINE 2-3 VW: CPT | Mod: 26

## 2018-04-19 RX ORDER — POTASSIUM CHLORIDE 20 MEQ
40 PACKET (EA) ORAL EVERY 4 HOURS
Qty: 0 | Refills: 0 | Status: COMPLETED | OUTPATIENT
Start: 2018-04-19 | End: 2018-04-19

## 2018-04-19 RX ADMIN — Medication 100 MILLIGRAM(S): at 20:49

## 2018-04-19 RX ADMIN — OXYCODONE AND ACETAMINOPHEN 2 TABLET(S): 5; 325 TABLET ORAL at 07:18

## 2018-04-19 RX ADMIN — ATENOLOL 25 MILLIGRAM(S): 25 TABLET ORAL at 06:31

## 2018-04-19 RX ADMIN — OXYCODONE AND ACETAMINOPHEN 2 TABLET(S): 5; 325 TABLET ORAL at 15:20

## 2018-04-19 RX ADMIN — OXYCODONE AND ACETAMINOPHEN 2 TABLET(S): 5; 325 TABLET ORAL at 22:20

## 2018-04-19 RX ADMIN — Medication 1 TABLET(S): at 10:32

## 2018-04-19 RX ADMIN — OXYCODONE AND ACETAMINOPHEN 2 TABLET(S): 5; 325 TABLET ORAL at 10:32

## 2018-04-19 RX ADMIN — FAMOTIDINE 20 MILLIGRAM(S): 10 INJECTION INTRAVENOUS at 18:55

## 2018-04-19 RX ADMIN — Medication 100 MILLIGRAM(S): at 13:04

## 2018-04-19 RX ADMIN — OXYCODONE AND ACETAMINOPHEN 2 TABLET(S): 5; 325 TABLET ORAL at 03:06

## 2018-04-19 RX ADMIN — HYDROMORPHONE HYDROCHLORIDE 0.5 MILLIGRAM(S): 2 INJECTION INTRAMUSCULAR; INTRAVENOUS; SUBCUTANEOUS at 23:23

## 2018-04-19 RX ADMIN — OXYCODONE AND ACETAMINOPHEN 2 TABLET(S): 5; 325 TABLET ORAL at 23:23

## 2018-04-19 RX ADMIN — FAMOTIDINE 20 MILLIGRAM(S): 10 INJECTION INTRAVENOUS at 06:31

## 2018-04-19 RX ADMIN — Medication 40 MILLIEQUIVALENT(S): at 13:04

## 2018-04-19 RX ADMIN — OXYCODONE AND ACETAMINOPHEN 2 TABLET(S): 5; 325 TABLET ORAL at 11:15

## 2018-04-19 RX ADMIN — OXYCODONE AND ACETAMINOPHEN 2 TABLET(S): 5; 325 TABLET ORAL at 02:06

## 2018-04-19 RX ADMIN — Medication 40 MILLIEQUIVALENT(S): at 09:23

## 2018-04-19 RX ADMIN — AMLODIPINE BESYLATE 10 MILLIGRAM(S): 2.5 TABLET ORAL at 06:31

## 2018-04-19 RX ADMIN — OXYCODONE AND ACETAMINOPHEN 2 TABLET(S): 5; 325 TABLET ORAL at 06:31

## 2018-04-19 RX ADMIN — Medication 10 MILLIEQUIVALENT(S): at 12:47

## 2018-04-19 RX ADMIN — CYCLOBENZAPRINE HYDROCHLORIDE 10 MILLIGRAM(S): 10 TABLET, FILM COATED ORAL at 02:06

## 2018-04-19 RX ADMIN — ATORVASTATIN CALCIUM 20 MILLIGRAM(S): 80 TABLET, FILM COATED ORAL at 20:49

## 2018-04-19 RX ADMIN — HYDROMORPHONE HYDROCHLORIDE 0.5 MILLIGRAM(S): 2 INJECTION INTRAMUSCULAR; INTRAVENOUS; SUBCUTANEOUS at 23:38

## 2018-04-19 RX ADMIN — Medication 100 MILLIGRAM(S): at 06:31

## 2018-04-19 RX ADMIN — OXYCODONE AND ACETAMINOPHEN 2 TABLET(S): 5; 325 TABLET ORAL at 14:43

## 2018-04-19 RX ADMIN — Medication 40 MILLIGRAM(S): at 06:31

## 2018-04-19 RX ADMIN — SENNA PLUS 2 TABLET(S): 8.6 TABLET ORAL at 20:49

## 2018-04-19 RX ADMIN — CYCLOBENZAPRINE HYDROCHLORIDE 10 MILLIGRAM(S): 10 TABLET, FILM COATED ORAL at 22:20

## 2018-04-19 NOTE — PROGRESS NOTE ADULT - SUBJECTIVE AND OBJECTIVE BOX
SUBJECTIVE: working with PT; comfortable    OBJECTIVE: in cervical collar    Vital Signs Last 24 Hrs  T(C): 37.4 (19 Apr 2018 13:39), Max: 37.5 (19 Apr 2018 05:07)  T(F): 99.3 (19 Apr 2018 13:39), Max: 99.5 (19 Apr 2018 05:07)  HR: 83 (19 Apr 2018 13:39) (71 - 86)  BP: 108/52 (19 Apr 2018 13:39) (102/51 - 131/62)  BP(mean): --  RR: 15 (19 Apr 2018 13:39) (15 - 18)  SpO2: 93% (19 Apr 2018 13:39) (93% - 99%)    Affected extremity:          Dressing:  clean/dry/intact           Drains: x 1 40 cc/8 hr         Sensation:           Upper extremity                ax        mc           m          u          r                                                         R          +           +             +           +          +                                               L           +           +             +           +          +         Lower extremeity             sp         dp         saph       zuñiga         tibial                                                R          +           +             +           +          +                                               L           +           +             +           +          +         Motor exam:          Upper extremity              Bi(c5)  WE(c6)  EE(c7)   FF(c8)                                                R         5/5        5/5        5/5       5/5                                               L          5/5        5/5        5/5       5/5         Lower extremeity          HF(l2)   KE(l3)    TA(l4)   EHL(l5)  GS(s1)                                                 R        5/5        5/5        5/5       5/5         5/5                                               L         5/5        5/5       5/5       5/5          5/5                                                 LABS:                        9.9    15.8  )-----------( 269      ( 19 Apr 2018 06:36 )             30.1     04-19    139  |  100  |  6<L>  ----------------------------<  101<H>  3.4<L>   |  30  |  0.72    Ca    8.8      19 Apr 2018 06:36            A/P :  67y Female s/p C3-C5 laminectomy  continue with PT/OOB  Dvt prophylaxis  plan for d/c drain in AM  follow up with plan for discharge to inpatient rehab

## 2018-04-19 NOTE — PROGRESS NOTE ADULT - SUBJECTIVE AND OBJECTIVE BOX
ORTHO NOTE    [x] Pt seen/examined.  [ ] Pt without any complaints/in NAD.    [x] Pt complains of: Trace discomfort in back of head from cervical collar.       ROS: [ ] Fever  [ ] Chills  [ ] CP [ ] SOB [ ] Dysnea  [ ] Palpitations [ ] Cough [ ] N/V/C/D [ ] Paresthia [ ] Other     [x] ROS  otherwise negative    .    PHYSICAL EXAM:    Vital Signs Last 24 Hrs  T(C): 36.6 (19 Apr 2018 09:35), Max: 37.5 (19 Apr 2018 05:07)  T(F): 97.9 (19 Apr 2018 09:35), Max: 99.5 (19 Apr 2018 05:07)  HR: 76 (19 Apr 2018 09:35) (71 - 86)  BP: 131/62 (19 Apr 2018 09:35) (102/51 - 131/62)  BP(mean): 68 (18 Apr 2018 12:33) (68 - 68)  RR: 17 (19 Apr 2018 09:35) (13 - 18)  SpO2: 96% (19 Apr 2018 09:35) (93% - 99%)    I&O's Detail    18 Apr 2018 07:01  -  19 Apr 2018 07:00  --------------------------------------------------------  IN:    lactated ringers.: 1250 mL    Oral Fluid: 1180 mL  Total IN: 2430 mL    OUT:    Drain: 125 mL    Indwelling Catheter - Urethral: 3550 mL    Voided: 500 mL  Total OUT: 4175 mL    Total NET: -1745 mL      19 Apr 2018 07:01  -  19 Apr 2018 12:24  --------------------------------------------------------  IN:  Total IN: 0 mL    OUT:    Voided: 400 mL  Total OUT: 400 mL    Total NET: -400 mL           CAPILLARY BLOOD GLUCOSE                      Neuro:    Lungs:    CV:    ABD:     Ext:  Gauze and tegaderm posterior cervical c/d/i  Cervical collar in place   HVx1  UE 5/5 , bi/tri/delt  AIN/PIN/Uln/Rad nerves intact  SILT C3-5, WWP b/l ue         LABS   19 Apr 2018 06:36    139    |  100    |  6      ----------------------------<  101    3.4     |  30     |  0.72     Ca    8.8        19 Apr 2018 06:36                                   9.9    15.8  )-----------( 269      ( 19 Apr 2018 06:36 )             30.1                 [ ] Other Labs  [ ] None ordered            Please check or [place] when present:  •  Heart Failure:    [ ] Acute        [ ]  Acute on Chronic        [ ] Chronic         [ ] Diastolic     [ ]  Combined    •  SAJI:     [ ] ATN        [ ]  Renal medullary necrosis       [ ]  Renal cortical necrosis                  [ ] Other pathological Lesion:  •  CKD:  [ ] Stage I   [ ] Stage II  [ ] Stage III    [ ]Stage IV   [ ]  CKD V   [ ]  Other/Unspecified:    •  Abdominal Nutritional Status:   [ ] Malnutrition-See Nutrition note    [ ] Cachexia   [ ]  Other        [ ] Supplement ordered:            [ ] Morbid Obesity: BMI >=40         ASSESSMENT/PLAN: 67y female POD#2 s/p posterior cervical lami/decompression C3-5           CONTINUE:          [ ] PT    [ ] DVT PPX- SCDs    [ ] Pain Mgt    [ ] Dispo plan- Acute rehab

## 2018-04-20 VITALS
OXYGEN SATURATION: 94 % | RESPIRATION RATE: 16 BRPM | SYSTOLIC BLOOD PRESSURE: 110 MMHG | TEMPERATURE: 99 F | HEART RATE: 82 BPM | DIASTOLIC BLOOD PRESSURE: 57 MMHG

## 2018-04-20 LAB
ANION GAP SERPL CALC-SCNC: 10 MMOL/L — SIGNIFICANT CHANGE UP (ref 5–17)
APPEARANCE UR: CLEAR — SIGNIFICANT CHANGE UP
BASOPHILS NFR BLD AUTO: 0.2 % — SIGNIFICANT CHANGE UP (ref 0–2)
BILIRUB UR-MCNC: NEGATIVE — SIGNIFICANT CHANGE UP
BUN SERPL-MCNC: 9 MG/DL — SIGNIFICANT CHANGE UP (ref 7–23)
CALCIUM SERPL-MCNC: 8.9 MG/DL — SIGNIFICANT CHANGE UP (ref 8.4–10.5)
CHLORIDE SERPL-SCNC: 98 MMOL/L — SIGNIFICANT CHANGE UP (ref 96–108)
CO2 SERPL-SCNC: 30 MMOL/L — SIGNIFICANT CHANGE UP (ref 22–31)
COLOR SPEC: YELLOW — SIGNIFICANT CHANGE UP
CREAT SERPL-MCNC: 0.75 MG/DL — SIGNIFICANT CHANGE UP (ref 0.5–1.3)
DIFF PNL FLD: (no result)
EOSINOPHIL NFR BLD AUTO: 1.1 % — SIGNIFICANT CHANGE UP (ref 0–6)
GLUCOSE SERPL-MCNC: 106 MG/DL — HIGH (ref 70–99)
GLUCOSE UR QL: NEGATIVE — SIGNIFICANT CHANGE UP
HCT VFR BLD CALC: 31.2 % — LOW (ref 34.5–45)
HGB BLD-MCNC: 10.1 G/DL — LOW (ref 11.5–15.5)
KETONES UR-MCNC: (no result) MG/DL
LEUKOCYTE ESTERASE UR-ACNC: (no result)
LYMPHOCYTES # BLD AUTO: 21.9 % — SIGNIFICANT CHANGE UP (ref 13–44)
MCHC RBC-ENTMCNC: 29.5 PG — SIGNIFICANT CHANGE UP (ref 27–34)
MCHC RBC-ENTMCNC: 32.4 G/DL — SIGNIFICANT CHANGE UP (ref 32–36)
MCV RBC AUTO: 91.2 FL — SIGNIFICANT CHANGE UP (ref 80–100)
MONOCYTES NFR BLD AUTO: 7.7 % — SIGNIFICANT CHANGE UP (ref 2–14)
NEUTROPHILS NFR BLD AUTO: 69.1 % — SIGNIFICANT CHANGE UP (ref 43–77)
NITRITE UR-MCNC: NEGATIVE — SIGNIFICANT CHANGE UP
PH UR: 7.5 — SIGNIFICANT CHANGE UP (ref 5–8)
PLATELET # BLD AUTO: 283 K/UL — SIGNIFICANT CHANGE UP (ref 150–400)
POTASSIUM SERPL-MCNC: 3.9 MMOL/L — SIGNIFICANT CHANGE UP (ref 3.5–5.3)
POTASSIUM SERPL-SCNC: 3.9 MMOL/L — SIGNIFICANT CHANGE UP (ref 3.5–5.3)
PROT UR-MCNC: NEGATIVE MG/DL — SIGNIFICANT CHANGE UP
RBC # BLD: 3.42 M/UL — LOW (ref 3.8–5.2)
RBC # FLD: 14.5 % — SIGNIFICANT CHANGE UP (ref 10.3–16.9)
SODIUM SERPL-SCNC: 138 MMOL/L — SIGNIFICANT CHANGE UP (ref 135–145)
SP GR SPEC: 1.02 — SIGNIFICANT CHANGE UP (ref 1–1.03)
UROBILINOGEN FLD QL: 0.2 E.U./DL — SIGNIFICANT CHANGE UP
WBC # BLD: 16.8 K/UL — HIGH (ref 3.8–10.5)
WBC # FLD AUTO: 16.8 K/UL — HIGH (ref 3.8–10.5)

## 2018-04-20 PROCEDURE — C1889: CPT

## 2018-04-20 PROCEDURE — 82330 ASSAY OF CALCIUM: CPT

## 2018-04-20 PROCEDURE — 82962 GLUCOSE BLOOD TEST: CPT

## 2018-04-20 PROCEDURE — 72040 X-RAY EXAM NECK SPINE 2-3 VW: CPT

## 2018-04-20 PROCEDURE — 36415 COLL VENOUS BLD VENIPUNCTURE: CPT

## 2018-04-20 PROCEDURE — 81001 URINALYSIS AUTO W/SCOPE: CPT

## 2018-04-20 PROCEDURE — 76000 FLUOROSCOPY <1 HR PHYS/QHP: CPT

## 2018-04-20 PROCEDURE — 85018 HEMOGLOBIN: CPT

## 2018-04-20 PROCEDURE — 97162 PT EVAL MOD COMPLEX 30 MIN: CPT

## 2018-04-20 PROCEDURE — 84295 ASSAY OF SERUM SODIUM: CPT

## 2018-04-20 PROCEDURE — 86900 BLOOD TYPING SEROLOGIC ABO: CPT

## 2018-04-20 PROCEDURE — 84132 ASSAY OF SERUM POTASSIUM: CPT

## 2018-04-20 PROCEDURE — 86850 RBC ANTIBODY SCREEN: CPT

## 2018-04-20 PROCEDURE — 80048 BASIC METABOLIC PNL TOTAL CA: CPT

## 2018-04-20 PROCEDURE — 97116 GAIT TRAINING THERAPY: CPT

## 2018-04-20 PROCEDURE — 97110 THERAPEUTIC EXERCISES: CPT

## 2018-04-20 PROCEDURE — 86901 BLOOD TYPING SEROLOGIC RH(D): CPT

## 2018-04-20 PROCEDURE — 85025 COMPLETE CBC W/AUTO DIFF WBC: CPT

## 2018-04-20 RX ORDER — SENNA PLUS 8.6 MG/1
2 TABLET ORAL
Qty: 0 | Refills: 0 | COMMUNITY
Start: 2018-04-20

## 2018-04-20 RX ORDER — ACETAMINOPHEN 500 MG
2 TABLET ORAL
Qty: 0 | Refills: 0 | COMMUNITY
Start: 2018-04-20

## 2018-04-20 RX ADMIN — OXYCODONE AND ACETAMINOPHEN 2 TABLET(S): 5; 325 TABLET ORAL at 09:09

## 2018-04-20 RX ADMIN — Medication 100 MILLIGRAM(S): at 12:15

## 2018-04-20 RX ADMIN — FAMOTIDINE 20 MILLIGRAM(S): 10 INJECTION INTRAVENOUS at 05:06

## 2018-04-20 RX ADMIN — OXYCODONE AND ACETAMINOPHEN 2 TABLET(S): 5; 325 TABLET ORAL at 10:05

## 2018-04-20 RX ADMIN — ATENOLOL 25 MILLIGRAM(S): 25 TABLET ORAL at 05:06

## 2018-04-20 RX ADMIN — Medication 10 MILLIEQUIVALENT(S): at 12:15

## 2018-04-20 RX ADMIN — Medication 100 MILLIGRAM(S): at 05:06

## 2018-04-20 RX ADMIN — OXYCODONE AND ACETAMINOPHEN 2 TABLET(S): 5; 325 TABLET ORAL at 05:06

## 2018-04-20 RX ADMIN — OXYCODONE AND ACETAMINOPHEN 2 TABLET(S): 5; 325 TABLET ORAL at 14:03

## 2018-04-20 RX ADMIN — OXYCODONE AND ACETAMINOPHEN 2 TABLET(S): 5; 325 TABLET ORAL at 06:06

## 2018-04-20 RX ADMIN — Medication 1 TABLET(S): at 12:15

## 2018-04-20 RX ADMIN — CYCLOBENZAPRINE HYDROCHLORIDE 10 MILLIGRAM(S): 10 TABLET, FILM COATED ORAL at 05:06

## 2018-04-20 RX ADMIN — Medication 40 MILLIGRAM(S): at 05:06

## 2018-04-20 RX ADMIN — OXYCODONE AND ACETAMINOPHEN 2 TABLET(S): 5; 325 TABLET ORAL at 14:45

## 2018-04-20 RX ADMIN — AMLODIPINE BESYLATE 10 MILLIGRAM(S): 2.5 TABLET ORAL at 05:06

## 2018-04-20 NOTE — PROGRESS NOTE ADULT - SUBJECTIVE AND OBJECTIVE BOX
SUBJECTIVE: working with PT; comfortable    OBJECTIVE: in cervical collar    Vital Signs Last 24 Hrs  T(C): 37.4 (19 Apr 2018 13:39), Max: 37.5 (19 Apr 2018 05:07)  T(F): 99.3 (19 Apr 2018 13:39), Max: 99.5 (19 Apr 2018 05:07)  HR: 83 (19 Apr 2018 13:39) (71 - 86)  BP: 108/52 (19 Apr 2018 13:39) (102/51 - 131/62)  BP(mean): --  RR: 15 (19 Apr 2018 13:39) (15 - 18)  SpO2: 93% (19 Apr 2018 13:39) (93% - 99%)    Affected extremity:          Dressing:  clean/dry/intact           Drains: x 1 15 cc/8 hr         Sensation:           Upper extremity                ax        mc           m          u          r                                                         R          +           +             +           +          +                                               L           +           +             +           +          +         Lower extremeity             sp         dp         saph       zuñiga         tibial                                                R          +           +             +           +          +                                               L           +           +             +           +          +         Motor exam:          Upper extremity              Bi(c5)  WE(c6)  EE(c7)   FF(c8)                                                R         5/5        5/5        5/5       5/5                                               L          5/5        5/5        5/5       5/5         Lower extremeity          HF(l2)   KE(l3)    TA(l4)   EHL(l5)  GS(s1)                                                 R        5/5        5/5        5/5       5/5         5/5                                               L         5/5        5/5       5/5       5/5          5/5                                                 LABS:                        9.9    15.8  )-----------( 269      ( 19 Apr 2018 06:36 )             30.1     04-19    139  |  100  |  6<L>  ----------------------------<  101<H>  3.4<L>   |  30  |  0.72    Ca    8.8      19 Apr 2018 06:36            A/P :  67y Female s/p C3-C5 laminectomy  continue with PT/OOB  Dvt prophylaxis  plan for d/c drain today  follow up with plan for discharge to inpatient rehab

## 2018-04-20 NOTE — PROGRESS NOTE ADULT - SUBJECTIVE AND OBJECTIVE BOX
SUBJECTIVE: Patient doing well. progressing with ambulation    OBJECTIVE: improving OOB on her own    Vital Signs Last 24 Hrs  T(C): 36.7 (20 Apr 2018 08:48), Max: 37.4 (19 Apr 2018 13:39)  T(F): 98 (20 Apr 2018 08:48), Max: 99.3 (19 Apr 2018 13:39)  HR: 77 (20 Apr 2018 08:48) (77 - 89)  BP: 112/60 (20 Apr 2018 08:48) (103/55 - 124/60)  BP(mean): --  RR: 15 (20 Apr 2018 08:48) (15 - 16)  SpO2: 100% (20 Apr 2018 08:48) (93% - 100%)    Affected extremity:          Dressing:  clean/dry/intact           Drains removed         Sensation:           Upper extremity                ax        mc           m          u          r                                                         R          +           +             +           +          +                                               L           +           +             +           +          +         Lower extremeity             sp         dp         saph       zuñiga         tibial                                                R          +           +             +           +          +                                               L           +           +             +           +          +         Motor exam:         motor exam intact 5/5 UE and LE                                                  LABS:                        10.1   16.8  )-----------( 283      ( 20 Apr 2018 07:16 )             31.2     04-20    138  |  98  |  9   ----------------------------<  106<H>  3.9   |  30  |  0.75    Ca    8.9      20 Apr 2018 07:16            A/P :  67y Female C3-C5 laminectomy    awaiting transfer to acute rehab  dvt prophylaxis  OOB  wound care and office follow up discussed

## 2018-04-24 DIAGNOSIS — M48.02 SPINAL STENOSIS, CERVICAL REGION: ICD-10-CM

## 2018-04-24 DIAGNOSIS — Z90.710 ACQUIRED ABSENCE OF BOTH CERVIX AND UTERUS: ICD-10-CM

## 2018-04-24 DIAGNOSIS — M54.12 RADICULOPATHY, CERVICAL REGION: ICD-10-CM

## 2018-04-24 DIAGNOSIS — Z98.1 ARTHRODESIS STATUS: ICD-10-CM

## 2018-04-24 DIAGNOSIS — G95.9 DISEASE OF SPINAL CORD, UNSPECIFIED: ICD-10-CM

## 2018-04-24 DIAGNOSIS — K25.9 GASTRIC ULCER, UNSPECIFIED AS ACUTE OR CHRONIC, WITHOUT HEMORRHAGE OR PERFORATION: ICD-10-CM

## 2018-04-24 DIAGNOSIS — E78.5 HYPERLIPIDEMIA, UNSPECIFIED: ICD-10-CM

## 2018-04-24 DIAGNOSIS — I10 ESSENTIAL (PRIMARY) HYPERTENSION: ICD-10-CM

## 2018-06-11 DIAGNOSIS — M48.02 SPINAL STENOSIS, CERVICAL REGION: ICD-10-CM

## 2018-07-16 PROBLEM — M19.90 UNSPECIFIED OSTEOARTHRITIS, UNSPECIFIED SITE: Chronic | Status: INACTIVE | Noted: 2017-07-14 | Resolved: 2017-08-16

## 2018-07-18 ENCOUNTER — APPOINTMENT (OUTPATIENT)
Dept: HEART AND VASCULAR | Facility: CLINIC | Age: 68
End: 2018-07-18

## 2018-12-07 NOTE — OCCUPATIONAL THERAPY INITIAL EVALUATION ADULT - ORIENTATION, REHAB EVAL
Office Visit    Patient Name: Sabrina Hsieh    : 1946  MRN: [id number]    Subjective:  Sabrina Altaf [facility]o [facility] at [facility] OR 1ST FLR    BLOCK-JOINT-SACROILIAC- Right SI Joint Injection & Left Ischial Bursa Steroid Injection N/A 3/31/2016    Performed by Xin Corrales MD at [facility] PAIN MGT    BREAST BIOPSY      CATARACT EXTRACTION      Both Eyes    CHOLECYSTECTOMY      COLONOSCOPY N/A 10/8/2014    Performed by Darryl Cates [facility] at University Health Lakewood Medical Altaf [facility]o [facility] at [facility] OR 1ST FLR    oriented to person, place, time and situation

## 2019-03-28 PROBLEM — I10 ESSENTIAL (PRIMARY) HYPERTENSION: Chronic | Status: ACTIVE | Noted: 2017-07-14

## 2019-03-28 PROBLEM — E78.5 HYPERLIPIDEMIA, UNSPECIFIED: Chronic | Status: ACTIVE | Noted: 2017-07-14

## 2019-03-28 PROBLEM — M17.10 UNILATERAL PRIMARY OSTEOARTHRITIS, UNSPECIFIED KNEE: Chronic | Status: ACTIVE | Noted: 2017-08-16

## 2019-03-28 PROBLEM — K25.9 GASTRIC ULCER, UNSPECIFIED AS ACUTE OR CHRONIC, WITHOUT HEMORRHAGE OR PERFORATION: Chronic | Status: ACTIVE | Noted: 2017-07-14

## 2019-04-03 ENCOUNTER — APPOINTMENT (OUTPATIENT)
Dept: ULTRASOUND IMAGING | Facility: HOSPITAL | Age: 69
End: 2019-04-03
Payer: MEDICARE

## 2019-04-03 ENCOUNTER — OUTPATIENT (OUTPATIENT)
Dept: OUTPATIENT SERVICES | Facility: HOSPITAL | Age: 69
LOS: 1 days | End: 2019-04-03
Payer: MEDICARE

## 2019-04-03 VITALS
SYSTOLIC BLOOD PRESSURE: 127 MMHG | RESPIRATION RATE: 16 BRPM | HEIGHT: 63 IN | TEMPERATURE: 97 F | HEART RATE: 54 BPM | WEIGHT: 212.31 LBS | DIASTOLIC BLOOD PRESSURE: 63 MMHG | OXYGEN SATURATION: 98 %

## 2019-04-03 DIAGNOSIS — Z98.890 OTHER SPECIFIED POSTPROCEDURAL STATES: Chronic | ICD-10-CM

## 2019-04-03 DIAGNOSIS — Z90.710 ACQUIRED ABSENCE OF BOTH CERVIX AND UTERUS: Chronic | ICD-10-CM

## 2019-04-03 DIAGNOSIS — Z41.9 ENCOUNTER FOR PROCEDURE FOR PURPOSES OTHER THAN REMEDYING HEALTH STATE, UNSPECIFIED: Chronic | ICD-10-CM

## 2019-04-03 PROCEDURE — 77065 DX MAMMO INCL CAD UNI: CPT | Mod: 26,RT

## 2019-04-03 PROCEDURE — C1739: CPT

## 2019-04-03 PROCEDURE — 19285 PERQ DEV BREAST 1ST US IMAG: CPT | Mod: RT

## 2019-04-03 PROCEDURE — 77065 DX MAMMO INCL CAD UNI: CPT

## 2019-04-03 PROCEDURE — 19285 PERQ DEV BREAST 1ST US IMAG: CPT

## 2019-04-03 RX ORDER — ATORVASTATIN CALCIUM 80 MG/1
1 TABLET, FILM COATED ORAL
Qty: 0 | Refills: 0 | COMMUNITY

## 2019-04-03 RX ORDER — ATENOLOL 25 MG/1
1 TABLET ORAL
Qty: 0 | Refills: 0 | COMMUNITY

## 2019-04-03 RX ORDER — GABAPENTIN 400 MG/1
1 CAPSULE ORAL
Qty: 0 | Refills: 0 | COMMUNITY

## 2019-04-03 RX ORDER — GABAPENTIN 400 MG/1
2 CAPSULE ORAL
Qty: 0 | Refills: 0 | COMMUNITY

## 2019-04-03 RX ORDER — AMLODIPINE BESYLATE 2.5 MG/1
1 TABLET ORAL
Qty: 0 | Refills: 0 | COMMUNITY

## 2019-04-03 RX ORDER — FUROSEMIDE 40 MG
1 TABLET ORAL
Qty: 0 | Refills: 0 | COMMUNITY

## 2019-04-03 RX ORDER — POTASSIUM CHLORIDE 20 MEQ
0 PACKET (EA) ORAL
Qty: 0 | Refills: 0 | COMMUNITY

## 2019-04-03 NOTE — ASU PATIENT PROFILE, ADULT - PMH
SORENSON (dyspnea on exertion)    Gastric ulcer    HLD (hyperlipidemia)    HTN (hypertension)    Osteoarthritis of knee, unilateral  left knee  Rash of back Breast mass, right    SORENSON (dyspnea on exertion)    Gastric ulcer    HLD (hyperlipidemia)    HTN (hypertension)    Osteoarthritis of knee, unilateral  left knee  Rash of back

## 2019-04-04 ENCOUNTER — OUTPATIENT (OUTPATIENT)
Dept: OUTPATIENT SERVICES | Facility: HOSPITAL | Age: 69
LOS: 1 days | End: 2019-04-04
Payer: MEDICARE

## 2019-04-04 ENCOUNTER — RESULT REVIEW (OUTPATIENT)
Age: 69
End: 2019-04-04

## 2019-04-04 ENCOUNTER — OUTPATIENT (OUTPATIENT)
Dept: OUTPATIENT SERVICES | Facility: HOSPITAL | Age: 69
LOS: 1 days | Discharge: HOME | End: 2019-04-04
Payer: MEDICARE

## 2019-04-04 VITALS — RESPIRATION RATE: 18 BRPM | TEMPERATURE: 98 F | OXYGEN SATURATION: 100 % | HEART RATE: 62 BPM

## 2019-04-04 DIAGNOSIS — Z98.890 OTHER SPECIFIED POSTPROCEDURAL STATES: Chronic | ICD-10-CM

## 2019-04-04 DIAGNOSIS — Z90.710 ACQUIRED ABSENCE OF BOTH CERVIX AND UTERUS: Chronic | ICD-10-CM

## 2019-04-04 DIAGNOSIS — Z41.9 ENCOUNTER FOR PROCEDURE FOR PURPOSES OTHER THAN REMEDYING HEALTH STATE, UNSPECIFIED: Chronic | ICD-10-CM

## 2019-04-04 DIAGNOSIS — N63.0 UNSPECIFIED LUMP IN UNSPECIFIED BREAST: ICD-10-CM

## 2019-04-04 LAB — SURGICAL PATHOLOGY STUDY: SIGNIFICANT CHANGE UP

## 2019-04-04 PROCEDURE — 76098 X-RAY EXAM SURGICAL SPECIMEN: CPT | Mod: 26

## 2019-04-04 PROCEDURE — 88321 CONSLTJ&REPRT SLD PREP ELSWR: CPT

## 2019-04-04 PROCEDURE — 19125 EXCISION BREAST LESION: CPT | Mod: RT

## 2019-04-04 PROCEDURE — 76098 X-RAY EXAM SURGICAL SPECIMEN: CPT

## 2019-04-04 PROCEDURE — 88307 TISSUE EXAM BY PATHOLOGIST: CPT

## 2019-04-04 NOTE — PACU DISCHARGE NOTE - COMMENTS
Patient dressed independently without any issues, denies pain. IV removed with catheter intact, discharged per protocol.

## 2019-04-11 LAB — SURGICAL PATHOLOGY STUDY: SIGNIFICANT CHANGE UP

## 2019-05-23 NOTE — PATIENT PROFILE ADULT. - NS PRO ABUSE SCREEN SUSPICION NEGLECT YN
Infectious Diseases Daily Progress Note      Jolynn Calabrese  Date of Service: 5/23/2019   Hospital Day: 6  Principal Diagnosis:                            This is  Jolynn Calabrese who is a 24 year old  female admitted 5/18/2019  6:37 PM     1. New fever.     Current antimicrobials:                             Ceftriaxone  Flagyl    Scheduled Medications     cefTRIAXone (ROCEPHIN) 1000 mg/100 mL NS minibag plus 1,000 mg Daily   insulin glargine 23 Units Nightly   atorvastatin 10 mg Nightly   busPIRone 15 mg BID   metoPROLOL tartrate 25 mg 2 times per day   ziprasidone 20 mg BID WC   sodium chloride (PF) 2 mL 2 times per day   WARFARIN - PHARMACIST MONITORED  See Admin Instructions   enoxaparin (LOVENOX) injection 1 mg/kg Q12H   metroNIDAZOLE 500 mg TID   insulin lispro  TID AC       PMHx, Social Hx , Medications and Allergies reviewed.    Reviewed Pertinent: Laboratory studies, radiographic studies, medications, and recent progress notes.    Subjective: no new issues.     ROS: No fever, chills, no nausea or abd pain, diarrhea  , No rashes     Objective:     Vital Signs:   Visit Vitals  /81 (BP Location: [facility], Patient Position: Semi-Holguin's)   Pulse 98   Temp 98.4 °F (36.9 °C) (Axillary)   Resp 18   Ht 5' 5\" (1.651 m)   Wt 55.9 kg   SpO2 96%   BMI 20.51 kg/m²      Temp  Min: 98.2 °F (36.8 °C)  Max: 98.8 °F (37.1 °C)     Physical Exam:    Constiutional: NAD. Awake. Unchanged. Eating.   HEENT: no thrush.   Neck: trachea midline, supple, no cervical or supraclavicular lymphadenopathy or masses  Cardiovascular: RRR, normal S1 S2, no murmurs, rubs or gallops, no JVD, No peripheral edema  Respiratory: CTA   Gastrointestinal: +BS, non-distended, soft, non-tender, no rebound or guarding  Genitourinary: clear urine.   Musculoskeletal: ROM and motor strength grossly normal. DP and radial pulses 2+ and symmetric.  Skin: no rashes, jaundice or other lesions, sacral wound not seen now.   Neurologic: non focal.  Spastic.  Psychiatric: Nonverbal and noncommunicative.      Labs Reviewed    Reviewed     Recent Labs   Lab 05/22/19  1711 05/22/19  0615 05/21/19  0630 05/20/19  1055 05/20/19  0655 05/19/19  0804  05/18/19  1840   SODIUM  --   --   --  141  --  141  --  140   POTASSIUM  --   --  4.1 4.3 3.4 3.7  --  3.6   CHLORIDE  --   --   --  106  --  104  --  101   CO2  --   --   --  29  --  26  --  29   ANIONGAP  --   --   --  10  --  15  --  14   BUN  --   --   --  3*  --  4*  --  6   CREATININE  --   --   --  0.29*  --  0.30*  --  0.30*   GFRNA  --   --   --  >90  --  >90  --  >90   GFRA  --   --   --  >90  --  >90  --  >90   GLUCOSE  --   --   --  210*  --  297*  --  272*   CALCIUM  --   --   --  9.2  --  8.5  --  9.2   ALBUMIN  --   --   --  2.3*  --  2.3*  --  2.4*   MG 2.0 1.4* 1.6*  --  1.6* 1.5*   < >  --    AST  --   --   --  18  --  11  --  11   GPT  --   --   --  30  --  29  --  36   ALKPT  --   --   --  54  --  50  --  58   BILIRUBIN  --   --   --  0.2  --  0.3  --  0.1*   RESR  --   --   --   --   --   --   --  46*   CRP  --   --   --   --   --  1.4*  --  1.1*    < > = values in this interval not displayed.    Recent Labs   Lab 05/20/19  1055 05/19/19  0804 05/18/19  1840   WBC 6.5 6.6 10.1   HGB 10.2* 10.1* 10.5*   HCT 31.5* 30.7* 32.1*    324 400   CRP  --  1.4* 1.1*   )       Lab Results   Component Value Date    VANCR 10.9 05/21/2019    VANCT 10.0 05/09/2019     Recent Labs     05/06/19  1540 05/18/19  1840 05/19/19  0804   RESR 43* 46*  --    CRP 3.3* 1.1* 1.4*       URINALYSIS  Recent Labs   Lab 05/18/19  2310   USPG 1.012   UPH 6.0   UKET 80*   UPROT NEGATIVE   UGLU 150*   UBILI NEGATIVE   UROB 0.2   UWBC MODERATE*   UBACTR FEW*   UNITR NEGATIVE   LEUK 6 to 10   URBC NEGATIVE          MICROBIOLOGY:   Results for KEVIN WAGGONERLONDALVA CALLE (MRN [id number]) as of 5/10/2019 12:36    Ref. Range 5/6/2019 15:40   C-REACTIVE PROTEIN Latest Ref Range: <1.0 mg/dL 3.3 (H)   5/6 BC:NEGATIVE X 2  5/9 BC:negative.  X  2  5/18 BC: negative.  PICC and periphery.   5/18 Urine culture: negative.  UA clear.   MRSA: negative.       Urine culture 5/6: candida albicans.  5/8 swab L ischial: pending. proteus sensitive. Bacteroides. Mixed eliza.       Results for GUME WAGGONER (MRN [id number]) as of 5/21/2019 06:42   Ref. Range 5/19/2019 08:04   PROCALCITONIN Latest Ref Range: <0.10 ng/mL <0.05               Radiology/Imaging:     CXR: There is a left PICC in place with the tip projecting over the inferior  right atrium. Consider repositioning, as clinically indicated.      Gastric tube projects over the left upper quadrant.     The cardiomediastinal silhouette is within normal limits.      Prominent pulmonary vascular and interstitial markings which may reflect  mild congestive change versus a nonspecific infectious/inflammatory  process. No dense pulmonary consolidation.     No pleural effusion or pneumothorax.     XR hip: No definitive fracture identified. No dislocation. There are moderate  degenerative changes of the left hip joint with joint space narrowing and  marginal osteophyte formation.     Subtle cortical irregularity along the ischial tuberosity, compatible with  patient's known osteomyelitis. Soft tissue ulceration within this location  is better appreciated on recent MRI.        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 Non mobile.   Cognitive impairment. Here osteomyelitis  L ischial wound   New fever.         1. L ischial Wound infection , osteomyelitis.   Not seen by me now.   2. No sepsis. Low grade fever. No leukocytosis.   3. History of spastic quadriparesis from meningoencephalitis per history.   4. History of DM, PE in the past. Chronic anticoagulation.   5. New fever.      PLAN:      1.Ceftriaxone. Flagyl  2. New BC done . Line culture. Negative.  No further growth.    Patient is afebrile now. WBC normal.  procalcitonin normal.          May suggest to DC on prior regimen.             Sean Tellez M.D.  Infectious Diseases  Pager: [phone]  5/23/2019  7:05 AM     no

## 2019-06-14 NOTE — PATIENT PROFILE ADULT. - MEDICATION ADMINISTRATION INFO, PROFILE
Patient:   ROCKY DELA CRUZ            MRN: [id number]            FIN: [id number]              Age:   38 years     Sex:  FEMALE     :  79   Associated Diagnoses:   None   Author:   FAITH BEARDEN     Obstetrics Discharge Summary    Date of Admission: 19  Date of Discharge: 19    Attending Physician: Scott    Diagnosis at admission:   1. IUP at 34+1 wga  2. Contractions s/p fall    Diagnosis at discharge:   1. Same    HPI: Pt is a 39yo  @ 34+1wga who presents for fall. She reports walking up her parents driveway and fell. Thinks she may have hit the L side of her abdomen. Denies LOF, VB, endorses fetal movement, no contractions. Patient also reports hitting her right side  Pregnancy c/b AMA.    Hospital Course: He 8-year-old G1 at 34-2/7 weeks status post fall on her driveway yesterday at approximately 1 PM here for 23-hour observation.  Patient with occasional  contractions that she is not feeling and has since spaced out.  Fetal tracing has been category 1. Patient had declined steroids for fetal lung maturity and at this time fetal status of maternal status appears stable.  No evidence of active  labor reviewed.   Patient denies any history of domestic violence and reports that she has a safe environment at home.  If patient continues to appear reassuring will discharge home later today.  Formal ultrasound today showed appropriate growth.  No evidence of abruption seen on ultrasound today.  Kick count instructions and  labor precautions were reviewed with the patient and she verbalized understanding and agreement.  Repeat an NST and a BPP in 2  days.  Recommend twice weekly  testing secondary to advanced maternal age.    Labs/Vitals:  Vitals between:   10-NATALIYA-2019 21:40:02   TO   2019 21:40:02                   LAST RESULT MINIMUM MAXIMUM  Temperature 36.7 36.7 37.0  Heart Rate 77 72 81  Respiratory Rate 16 16 16  NISBP           102 102  136  NIDBP           60 60 75  NIMBP           74 74 95  SpO2                    100 100 100    Labs between:  10-NATALIYA-2019 22:04 to 11-JUN-2019 22:04    CBC:                 WBC  HgB  Hct  Plt  MCV  RDW   11-JUN-2019 (H) 12.5  (L) 10.7  (L) 31.8  162  95.2  12.6   11-JUN-2019 (H) 12.6  (L) 11.7  (L) 34.5  180  93.8  12.7     DIFF:                 Seg  Neutroph//ABS  Lymph//ABS  Mono//ABS  EOS/ABS  11-JUN-2019 NOT APPLICABLE  78 // (H) 9.7  15 // 1.9 6 // 0.8 0 // (L) 0.0  11-JUN-2019 NOT APPLICABLE  80 // (H) 10.0  13 // 1.7 6 // 0.8 0 // (L) 0.0     COAG:                 INR  PT  PTT  Ddimer  Fibrinogen    11-JUN-2019 0.9  10.1  28       11-JUN-2019 0.9  9.7  23           Plan: Discharge home in stable condition on HD#2. F/u NST/BPP as outpatient    ROBIN Chand MD R2   no concerns

## 2019-07-13 NOTE — PHYSICAL THERAPY INITIAL EVALUATION ADULT - DID THE PATIENT HAVE SURGERY?
Pt withdrawn to room  Social with peers  Selective with participation in treatment  Pt selectively refusing to see provider, refusing to complete assessment, and refusing groups  During meals patient taking excessive amount of time to finish meals  Defiant with redirection  Mood labile  Childlike  Attention seeking behaviors  Compliant with medications as prescribed  Denies SI, HI, or hallucinations  During assessment pt states, "when I am ready" pt will participate in plan of care  Safety precautions maintained  Will continue to monitor and assess  n/a

## 2020-06-10 NOTE — ASU PREOP CHECKLIST - BMI (KG/M2)
37.6 Interpolation Flap Text: A decision was made to reconstruct the defect utilizing an interpolation axial flap.  A telfa template was made of the defect. This telfa template was then used to outline the interpolation flap.  The donor area for the pedicle flap was then injected with anesthesia.  The flap was excised through the skin and subcutaneous tissue down to the layer of the underlying musculature.  The interpolation flap was carefully excised within this deep plane to maintain its blood supply.  The edges of the donor site were undermined.   The donor site was closed in a primary fashion.  The pedicle was then rotated into position and sutured.  Once the tube was sutured into place, adequate blood supply was confirmed with blanching and refill.

## 2020-11-21 PROBLEM — N63.10 UNSPECIFIED LUMP IN THE RIGHT BREAST, UNSPECIFIED QUADRANT: Chronic | Status: ACTIVE | Noted: 2019-04-03

## 2020-11-25 ENCOUNTER — APPOINTMENT (OUTPATIENT)
Dept: GYNECOLOGIC ONCOLOGY | Facility: CLINIC | Age: 70
End: 2020-11-25
Payer: MEDICARE

## 2020-11-25 VITALS
SYSTOLIC BLOOD PRESSURE: 144 MMHG | WEIGHT: 225 LBS | RESPIRATION RATE: 16 BRPM | HEIGHT: 64 IN | OXYGEN SATURATION: 99 % | TEMPERATURE: 97.1 F | DIASTOLIC BLOOD PRESSURE: 81 MMHG | HEART RATE: 79 BPM | BODY MASS INDEX: 38.41 KG/M2

## 2020-11-25 DIAGNOSIS — N95.0 POSTMENOPAUSAL BLEEDING: ICD-10-CM

## 2020-11-25 PROCEDURE — 99203 OFFICE O/P NEW LOW 30 MIN: CPT | Mod: 25

## 2020-11-25 PROCEDURE — 57100 BIOPSY VAGINAL MUCOSA SIMPLE: CPT

## 2020-11-25 NOTE — HISTORY OF PRESENT ILLNESS
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 Petr Leon at [facility]. Patient had PMB and on exam had a very friable cervix concerning for a cervical cancer. Patient reports that she first starting noticing scant vaginal bleeding about one month ago. Denies ever having post menopausal bleeding previously. Denies any abdominal pain, bloating, or weight loss. States she is not sexual active. \par Patient was previously followed by and had [facility]SO with Dr. JAYDE Burns. \par Operative Report from 2011-  total abdominal hysterectomy and pathology reports ectocervix and that sectioning revealed an external os that was stenotic and endocervical canal is dilated with abundant mucin.\par Review of outside records from [facility] shows that in ? 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

## 2020-11-25 NOTE — DISCUSSION/SUMMARY
[FreeTextEntry1] : Exam today very concerning for an invasive cancer, possibly vaginal primary given history of VAIN3 treated with 5FU\par \par [ ] biopsy of vaginal cuff friable mass obtained\par [ ] CT Abd/pelvis\par [ ] MRI abd/pelvis\par [ ] follow up in 2 weeks

## 2020-11-25 NOTE — PHYSICAL EXAM
[Normal] : Parametria: Normal [Absent] : Adnexa(ae): Absent [Abnormal] : Recto-Vaginal Exam: Abnormal [de-identified] : friable vaginal cuff, unable to visualize cervical os, mass noted that is friable and biopsy obtained

## 2020-11-30 LAB
ANION GAP SERPL CALC-SCNC: 14 MMOL/L
BASOPHILS # BLD AUTO: 0.06 K/UL
BASOPHILS NFR BLD AUTO: 0.6 %
BUN SERPL-MCNC: 10 MG/DL
CALCIUM SERPL-MCNC: 9.7 MG/DL
CANCER AG125 SERPL-ACNC: 4 U/ML
CEA SERPL-MCNC: 1 NG/ML
CHLORIDE SERPL-SCNC: 104 MMOL/L
CO2 SERPL-SCNC: 25 MMOL/L
CREAT SERPL-MCNC: 0.87 MG/DL
EOSINOPHIL # BLD AUTO: 0.1 K/UL
EOSINOPHIL NFR BLD AUTO: 0.9 %
GLUCOSE SERPL-MCNC: 113 MG/DL
HCT VFR BLD CALC: 43.1 %
HGB BLD-MCNC: 13.7 G/DL
IMM GRANULOCYTES NFR BLD AUTO: 0.4 %
LYMPHOCYTES # BLD AUTO: 3.1 K/UL
LYMPHOCYTES NFR BLD AUTO: 28.6 %
MAN DIFF?: NORMAL
MCHC RBC-ENTMCNC: 30.9 PG
MCHC RBC-ENTMCNC: 31.8 GM/DL
MCV RBC AUTO: 97.1 FL
MONOCYTES # BLD AUTO: 0.63 K/UL
MONOCYTES NFR BLD AUTO: 5.8 %
NEUTROPHILS # BLD AUTO: 6.9 K/UL
NEUTROPHILS NFR BLD AUTO: 63.7 %
PLATELET # BLD AUTO: 300 K/UL
POTASSIUM SERPL-SCNC: 4.5 MMOL/L
RBC # BLD: 4.44 M/UL
RBC # FLD: 14.1 %
SODIUM SERPL-SCNC: 143 MMOL/L
WBC # FLD AUTO: 10.83 K/UL

## 2020-12-08 NOTE — REVIEW OF SYSTEMS
12 Wife present. Dr Alma Morgan here and spoke with patient and wife. Upper dentures placed in patient's mouth. [Negative] : Musculoskeletal

## 2020-12-09 ENCOUNTER — APPOINTMENT (OUTPATIENT)
Dept: GYNECOLOGIC ONCOLOGY | Facility: CLINIC | Age: 70
End: 2020-12-09
Payer: MEDICARE

## 2020-12-09 VITALS
DIASTOLIC BLOOD PRESSURE: 81 MMHG | OXYGEN SATURATION: 98 % | BODY MASS INDEX: 37.56 KG/M2 | TEMPERATURE: 96.2 F | WEIGHT: 220 LBS | HEIGHT: 64 IN | HEART RATE: 67 BPM | SYSTOLIC BLOOD PRESSURE: 138 MMHG

## 2020-12-09 DIAGNOSIS — N89.8 OTHER SPECIFIED NONINFLAMMATORY DISORDERS OF VAGINA: ICD-10-CM

## 2020-12-09 DIAGNOSIS — C52 MALIGNANT NEOPLASM OF VAGINA: ICD-10-CM

## 2020-12-09 PROCEDURE — 99214 OFFICE O/P EST MOD 30 MIN: CPT

## 2020-12-10 NOTE — HISTORY OF PRESENT ILLNESS
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

## 2020-12-10 NOTE — DISCUSSION/SUMMARY
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 Spoke to patient and give her information for [facility] [phone], faxed records for continuity of care

## 2020-12-10 NOTE — PHYSICAL EXAM
[Absent] : Adnexa(ae): Absent [Normal] : Parametria: Normal [Abnormal] : Recto-Vaginal Exam: Abnormal [de-identified] : friable vaginal cuff, unable to visualize cervical os, mass noted that is friable and biopsy obtained

## 2020-12-19 ENCOUNTER — APPOINTMENT (OUTPATIENT)
Dept: MRI IMAGING | Facility: HOSPITAL | Age: 70
End: 2020-12-19

## 2020-12-19 ENCOUNTER — APPOINTMENT (OUTPATIENT)
Dept: CT IMAGING | Facility: HOSPITAL | Age: 70
End: 2020-12-19

## 2021-02-02 PROBLEM — N89.8 VAGINAL MASS: Status: ACTIVE | Noted: 2020-11-25

## 2021-03-07 NOTE — ED PROVIDER NOTE - CROS ED [facility] ALL NEG
- Call or seek medical attention for questions or concerns  - Follow up with Dr. Ascencio in 1-2 weeks time  - Follow up with Dr. Stacy as needed  - Avoid all blood thinners including aspirin or NSAIDs (ibuprophen, Advil, Aleve, Motrin) for at least two weeks  - Follow up with primary care provider within one weeks time  - Resume regular diet  - May take over the counter acetaminophen as needed for pain  - Continue daily over the counter stool softener while on narcotics  - No operation of machinery or motorized vehicles while under the influence of narcotics  - No alcohol, marijuana or illicit drug use while under the influence of narcotics  - In the event of a narcotic overdose naloxone (Narcan) is available without a prescription from any [facility] or [facility]  - No swimming, hot tubs, baths or wound submersion until cleared by outpatient provider. May shower  - No contact sports, strenuous activities, or heavy lifting until cleared by outpatient provider  - If respiratory decompensation, persistent or worsening pain, uncontrolled pain, or signs or symptoms of infection occur seek medical attention    Discharge Instructions    Discharged to home by car with relative. Discharged via wheelchair, hospital escort: Yes.  Special equipment needed: Not Applicable    Be sure to schedule a follow-up appointment with your primary care doctor or any specialists as instructed.     Discharge Plan:   Influenza Vaccine Indication: Patient Refuses    I understand that a diet low in cholesterol, fat, and sodium is recommended for good health. Unless I have been given specific instructions below for another diet, I accept this instruction as my diet prescription.   Other diet: Regular    Special Instructions: None    · Is patient discharged on Warfarin / Coumadin?   No     Depression / Suicide Risk    As you are discharged from this [facility] facility, it is important to learn how to keep safe from harming  yourself.    Recognize the warning signs:  · Abrupt changes in personality, positive or negative- including increase in energy   · Giving away possessions  · Change in eating patterns- significant weight changes-  positive or negative  · Change in sleeping patterns- unable to sleep or sleeping all the time   · Unwillingness or inability to communicate  · Depression  · Unusual sadness, discouragement and loneliness  · Talk of wanting to die  · Neglect of personal appearance   · Rebelliousness- reckless behavior  · Withdrawal from people/activities they love  · Confusion- inability to concentrate     If you or a loved one observes any of these behaviors or has concerns about self-harm, here's what you can do:  · Talk about it- your feelings and reasons for harming yourself  · Remove any means that you might use to hurt yourself (examples: pills, rope, extension cords, firearm)  · Get professional help from the community (Mental Health, Substance Abuse, psychological counseling)  · Do not be alone:Call your Safe Contact- someone whom you trust who will be there for you.  · Call your local CRISIS HOTLINE 816-6174 or 602-980-5760  · Call your local Children's Mobile Crisis Response Team [place] (674) 363-8659 or www.Shopcaster  · Call the toll free National Suicide Prevention Hotlines   · National Suicide Prevention Lifeline 451-565-ZFEH (7869)  · National Hope Line Network 800-SUICIDE (222-1778)    Concussion, Adult    A concussion is a brain injury from a hard, direct hit (trauma) to your head or body. This direct hit causes the brain to quickly shake back and forth inside the skull. A concussion may also be called a mild traumatic brain injury (TBI). Healing from this injury can take time.  What are the causes?  This condition is caused by:  · A direct hit to your head, such as:  ? Running into a player during a game.  ? Being hit in a fight.  ? Hitting your head on a hard surface.  · A quick and sudden  movement (jolt) of the head or neck, such as in a car crash.  What are the signs or symptoms?  The signs of a concussion can be hard to notice. They may be missed by you, family members, and doctors. You may look fine on the outside but may not act or feel normal.  Physical symptoms  · Headaches.  · Being tired (fatigued).  · Being dizzy.  · Problems with body balance.  · Problems seeing or hearing.  · Being sensitive to light or noise.  · Feeling sick to your stomach (nausea) or throwing up (vomiting).  · Not sleeping or eating as you used to.  · Loss of feeling (numbness) or tingling in the body.  · Seizure.  Mental and emotional symptoms  · Problems remembering things.  · Trouble focusing your mind (concentrating), organizing, or making decisions.  · Being slow to think, act, react, speak, or read.  · Feeling grouchy (irritable).  · Having mood changes.  · Feeling worried or nervous (anxious).  · Feeling sad (depressed).  How is this treated?  This condition may be treated by:  · Stopping sports or activity if you are injured. If you hit your head or have signs of concussion:  ? Do not return to sports or activities the same day.  ? Get checked by a doctor before you return to your activities.  · Resting your body and your mind.  · Being watched carefully, often at home.  · Medicines to help with symptoms such as:  ? Feeling sick to your stomach.  ? Headaches.  ? Problems with sleep.  § Avoid taking strong pain medicines (opioids) for a concussion.  · Avoiding alcohol and drugs.  · Being asked to go to a concussion clinic or a place to help you recover (rehabilitation center).  Recovery from a concussion can take time. Return to activities only:  · When you are fully healed.  · When your doctor says it is safe.  Follow these instructions at home:  Activity  · Limit activities that need a lot of thought or focus, such as:  ? Homework or work for your job.  ? Watching TV.  ? Using the computer or phone.  ? Playing  memory games and puzzles.  · Rest. Rest helps your brain heal. Make sure you:  ? Get plenty of sleep. Most adults should get 7-9 hours of sleep each night.  ? Rest during the day. Take naps or breaks when you feel tired.  · Avoid activity like exercise until your doctor says its safe. Stop any activity that makes symptoms worse.  · Do not do activities that could cause a second concussion, such as riding a bike or playing sports.  · Ask your doctor when you can return to your normal activities, such as school, work, sports, and driving. Your ability to react may be slower. Do not do these activities if you are dizzy.  General instructions    · Take over-the-counter and prescription medicines only as told by your doctor.  · Do not drink alcohol until your doctor says you can.  · Watch your symptoms and tell other people to do the same. Other problems can occur after a concussion. Older adults have a higher risk of serious problems.  · Tell your , teachers, school nurse, school counselor, , or  about your injury and symptoms. Tell them about what you can or cannot do.  · Keep all follow-up visits as told by your doctor. This is important.  How is this prevented?  · It is very important that you do not get another brain injury. In rare cases, another injury can cause brain damage that will not go away, brain swelling, or death. The risk of this is greatest in the first 7-10 days after a head injury. To avoid injuries:  ? Stop activities that could lead to a second concussion, such as contact sports, until your doctor says it is okay.  ? When you return to sports or activities:  § Do not crash into other players. This is how most concussions happen.  § Follow the rules.  § Respect other players.  ? Get regular exercise. Do strength and balance training.  ? Wear a helmet that fits you well during sports, biking, or other activities.  § Helmets can help protect you from serious skull and  brain injuries, but they do not protect you from a concussion. Even when wearing a helmet, you should avoid being hit in the head.  Contact a doctor if:  · Your symptoms get worse or they do not get better.  · You have new symptoms.  · You have another injury.  Get help right away if:  · You have bad headaches or your headaches get worse.  · You feel weak or numb in any part of your body.  · You are mixed up (confused).  · Your balance gets worse.  · You keep throwing up.  · You feel more sleepy than normal.  · Your speech is not clear (is slurred).  · You cannot recognize people or places.  · You have a seizure.  · Others have trouble waking you up.  · You have behavior changes.  · You have changes in how you see (vision).  · You pass out (lose consciousness).  Summary  · A concussion is a brain injury from a hard, direct hit (trauma) to your head or body.  · This condition is treated with rest and careful watching of symptoms.  · If you keep having symptoms, call your doctor.  This information is not intended to replace advice given to you by your health care provider. Make sure you discuss any questions you have with your health care provider.  Document Released: 12/06/2010 Document Revised: 08/08/2019 Document Reviewed: 08/08/2019   [employer] Patient Education © 2020 [employer]      Living With Traumatic Brain Injury  Traumatic brain injury (TBI) is an injury to the brain that may be mild, moderate, or severe. Symptoms of any type of TBI can be long lasting (chronic). Depending on the area of the brain that is affected, a TBI can interfere with vision, memory, concentration, speech, balance, sense of touch, and sleep. TBI can also cause chronic symptoms like headache or dizziness.  How to cope with lifestyle changes  After a TBI, you may need to make changes to your lifestyle in order to recover as well as possible. How quickly and how fully you recover will depend on the severity of your injury. Your recovery  plan may involve:  · Working with specialists to develop a rehabilitation plan to help you return to your regular activities. Your health care team may include:  ? Physical or occupational therapists.  ? Speech and language pathologists.  ? Mental health counselors.  ? Physicians like your primary care physician or neurologist.  · Taking time off work or school, depending on your injury.  · Avoiding situations where there is a risk for another head injury, such as football, hockey, soccer, basketball, martial arts, downhill snow sports, and horseback riding. Do not do these activities until your health care provider approves.  · Resting. Rest helps the brain to heal. Make sure you:  ? Get plenty of sleep at night. Avoid staying up late at night.  ? Keep the same bedtime hours on weekends and weekdays.  ? Rest during the day. Take daytime naps or rest breaks when you feel tired.  · Avoiding extra stress on your eyes. You may need to set time limits when working on the computer, watching TV, and reading.  · Finding ways to manage stress. This may include:  ? Avoiding activities that cause stress.  ? Deep breathing, yoga, or meditation.  ? Listening to music or spending time outdoors.  · Making lists, setting reminders, or using a day planner to help your memory.  · Allowing yourself plenty of time to complete everyday tasks, such as grocery shopping, paying bills, and doing laundry.  · Avoiding driving. Your ability to drive safely may be affected by your injury.  ? Rely on family, friends, or a transportation service to help you get around and to appointments.  ? Have a professional evaluation to check your driving ability.  ? Access support services to help you return to driving. These may include training and adaptive equipment.  Follow these instructions at home:  · Take over-the-counter and prescription medicines only as told by your health care provider. Do not take aspirin or other anti-inflammatory medicines  such as ibuprofen or naproxen unless approved by your health care provider.  · Avoid large amounts of caffeine. Your body may be more sensitive to it after your injury.  · Do not use any products that contain nicotine or tobacco, such as cigarettes, e-cigarettes, nicotine gum, and patches. If you need help quitting, ask your health care provider.  · Do not use drugs.  · Limit alcohol intake to no more than 1 drink per day for nonpregnant women and 2 drinks per day for men. One drink equals 12 ounces of beer, 5 ounces of wine, or 1½ ounces of hard liquor.  · Do not drive until cleared by your health care provider.  · Keep all follow-up visits as told by your health care provider. This is important.  Where to find support  · Talk with your employer, co-workers, teachers, or school counselor about your injury. Work together to develop a plan for completing tasks while you recover.  · Talk to others living with a TBI. Join a support group with other people who have experienced a TBI.  · Let your friends and family members know what they can do to help. This might include helping at home or transportation to appointments.  · If you are unable to continue working after your injury, talk to a  about options to help you meet your financial needs.  · Seek out additional resources if you are a  serviceman or family member, such as:  ? [facility]: dvbic.dcoe.mil  ? Department of Veterans Affairs  and Veterans Crisis Line: 1-597.305.3746  Questions to ask your health care provider:  · How serious is my injury?  · What is my rehabilitation plan?  · What is my expected recovery?  · When can I return to work or school?  · When can I return to regular activities, including driving?  Contact a health care provider if:  · You have new or worsening:  ? Dizziness.  ? Headache.  ? Anxiety or depression.  ? Irritability.  ? Confusion.  ? Jerky movements that you cannot control  (seizures).  ? Extreme sensitivity to light or sound.  ? Nausea or vomiting.  Summary  · Traumatic brain injury (TBI) is an injury to your brain that can interfere with vision, memory, concentration, speech, balance, sense of touch, and sleep. TBI can also cause chronic symptoms like headache or dizziness.  · After a TBI you may need to make several changes to your lifestyle in order to recover as well as possible. How quickly and how fully you recover will depend on the severity of your injury.  · Talk to your family, friends, employer, co-workers, teachers, or school counselor about your injury. Work together to develop a plan for completing tasks while you recover.  This information is not intended to replace advice given to you by your health care provider. Make sure you discuss any questions you have with your health care provider.  Document Released: 12/14/2017 Document Revised: 04/10/2020 Document Reviewed: 12/14/2017  [employer] Patient Education © 2020 [employer]     - - -

## 2021-08-10 NOTE — PROGRESS NOTE ADULT - SUBJECTIVE AND OBJECTIVE BOX
HPI:  65yo F with PMH of HTN, HLD, gastric ulcer, L knee osteoarthritis admitted to [facility] on 8/16/17 with progressive LE weakness, unable to ambulate found to have cord compression, for surgical repair today.   No events overnight. HR 54-63, asymptomatic. Echocardiogram done this morning, fu results.       PAST MEDICAL & SURGICAL HISTORY:  Osteoarthritis of knee, unilateral: left knee  Gastric ulcer  HLD (hyperlipidemia)  HTN (hypertension)  H/O excision of mass: plantar surface of left foot  S/P ovarian cystectomy      MEDICATIONS  (STANDING):  amLODIPine   Tablet 10 milliGRAM(s) Oral daily  atorvastatin 20 milliGRAM(s) Oral at bedtime  insulin lispro (HumaLOG) corrective regimen sliding scale   SubCutaneous Before meals and at bedtime  dextrose 5%. 1000 milliLiter(s) (50 mL/Hr) IV Continuous <Continuous>  dextrose 50% Injectable 12.5 Gram(s) IV Push once  dextrose 50% Injectable 25 Gram(s) IV Push once  dextrose 50% Injectable 25 Gram(s) IV Push once  dexamethasone  Injectable 6 milliGRAM(s) IV Push every 6 hours  pantoprazole    Tablet 40 milliGRAM(s) Oral before breakfast  heparin  Injectable 5000 Unit(s) SubCutaneous every 8 hours  lactated ringers. 1000 milliLiter(s) (120 mL/Hr) IV Continuous <Continuous>    MEDICATIONS  (PRN):  dextrose Gel 1 Dose(s) Oral once PRN Blood Glucose LESS THAN 70 milliGRAM(s)/deciliter  glucagon  Injectable 1 milliGRAM(s) IntraMuscular once PRN Glucose LESS THAN 70 milligrams/deciliter  bisacodyl 5 milliGRAM(s) Oral every 12 hours PRN Constipation  oxyCODONE    IR 5 milliGRAM(s) Oral every 4 hours PRN Severe Pain (7 - 10)  diclofenac 25 milliGRAM(s) Oral every 8 hours PRN moderate pain    Allergy Status Unknown  aspirin (Other)      Vital Signs Last 24 Hrs  T(C): 36.3 (18 Aug 2017 05:08), Max: 36.9 (17 Aug 2017 20:25)  T(F): 97.3 (18 Aug 2017 05:08), Max: 98.4 (17 Aug 2017 20:25)  HR: 54 (18 Aug 2017 05:08) (54 - 66)  BP: 113/69 (18 Aug 2017 05:08) (113/69 - 125/78)  BP(mean): --  RR: 20 (18 Aug 2017 05:08) (18 - 20)  SpO2: 100% (18 Aug 2017 05:08) (98% - 100%)    WEAKNESS: WEAKNESS  No h/o HF  No pertinent family history in first degree relatives  Handoff  MEWS Score: 2 (18-Aug-2017 05:09)  Osteoarthritis of knee, unilateral: left knee  Gastric ulcer  HLD (hyperlipidemia)  HTN (hypertension)  Weakness  Constipation: Constipation  Cervical spinal cord compression: Cervical spinal cord compression  Prophylactic measure: Prophylactic measure  Nutrition, metabolism, and development symptoms: Nutrition, metabolism, and development symptoms  HLD (hyperlipidemia): HLD (hyperlipidemia)  Bradycardia: Bradycardia  Essential hypertension: Essential hypertension  Weakness of both upper extremities: Weakness of both upper extremities  Osteoarthritis of knee, unilateral: Osteoarthritis of knee, unilateral  H/O excision of mass: plantar surface of left foot  S/P ovarian cystectomy  ANKLE SWELLING: ANKLE SWELLING  20  Inability to ambulate due to multiple joints      PT/INR - ( 18 Aug 2017 05:31 )   PT: 13.2 sec;   INR: 1.19          PTT - ( 17 Aug 2017 18:49 )  PTT:36.3 sec                          11.7   17.6  )-----------( 260      ( 18 Aug 2017 05:31 )             34.8       08-18    142  |  104  |  16  ----------------------------<  158<H>  3.8   |  23  |  0.70    Ca    9.3      18 Aug 2017 05:31    TPro  7.6  /  Alb  3.9  /  TBili  0.4  /  DBili  x   /  AST  12  /  ALT  14  /  AlkPhos  64  08-16      Urinalysis Basic - ( 18 Aug 2017 06:58 )    Color: Yellow / Appearance: Clear / SG: <=1.005 / pH: x  Gluc: x / Ketone: NEGATIVE  / Bili: NEGATIVE / Urobili: 0.2 E.U./dL   Blood: x / Protein: NEGATIVE mg/dL / Nitrite: NEGATIVE   Leuk Esterase: Trace / RBC: x / WBC < 5 /HPF   Sq Epi: x / Non Sq Epi: Few /HPF / Bacteria: Present /HPF        MD  consult reviewed       ROS                          + reviewed  H/P                          NO CHANGE  GENERAL                 + awake           +alert        -confused          - lethargic  CARDIOVASC          -chest pain    -palpitation         -SOB           -SORENSON  PULMONARY          -cough        -congestion             -wheezing  ENT                           -hoarseness         -sore throat      Gastrointestinal      -nausea         -vomitting         -diarrhea          -pain                                    -incontinent       -dysuria         -frequency       -retention      MS                              -weakness      PSYCH                        +awake           -agitation         -dementia    -delerium  SKIN                            - dry              -rash           -decubitus    PHYSICAL  EXAMINATION   VSS HR 54-63, (off atenolol)  General: Well developed; well nourished; in no acute distress  Eyes: PERRL, EOM intact; conjunctiva and sclera clear  Head: Normocephalic; atraumatic  Neck: Supple; non tender; no masses  Respiratory: No wheezes, rales or rhonchi  Cardiovascular: Regular rate and rhythm. S1 and S2 Normal; No murmurs, gallops or rubs  Gastrointestinal: Soft non-tender non-distended; Normal bowel sounds; No hepatosplenomegaly  Extremities:  + pitting edema - bilaterally            Peripheral pulses palpable 2+ bilaterally  Neurological: Alert and oriented x 3, CN 2-12 grossly intact    Assesment/PLAN  66yFemale Osteoarthritis of knee, unilateral  Gastric ulcer  HLD (hyperlipidemia)  HTN (hypertension)  admitted with Cervical cord compression after being sent to ER for worsening LE weakness (S/P fall) by neurologist ([facility])  For surgical repair today by Dr. Martin  Continue steroids (leukocytosis secondary to steroids)  Will follow up echocardiogram prior to surgery.   Continue DVT prophylaxis  Continue statin and Hypertensive regimen.   Hemodynamically stable. None

## 2024-09-20 NOTE — PROGRESS NOTE ADULT - SUBJECTIVE AND OBJECTIVE BOX
SUBJECTIVE: No new complaints. no dysphagia/dysphonia. feels stronger    OBJECTIVE: comfortable    Vital Signs Last 24 Hrs  T(C): 36 (19 Aug 2017 08:58), Max: 36.6 (18 Aug 2017 23:30)  T(F): 96.8 (19 Aug 2017 08:58), Max: 97.9 (18 Aug 2017 23:30)  HR: 52 (19 Aug 2017 10:51) (50 - 90)  BP: 136/65 (19 Aug 2017 10:51) (129/60 - 165/72)  BP(mean): 115 (18 Aug 2017 22:31) (96 - 115)  RR: 16 (19 Aug 2017 08:58) (13 - 22)  SpO2: 93% (19 Aug 2017 10:51) (93% - 97%)    Affected extremity:          Dressing:  clean/dry/intact           Drains: x1 drains-shift output:          Sensation:           Upper extremity                ax        mc           m          u          r                                                         R          +           +             +           +          +                                               L           +           +             +           +          +         Lower extremeity             sp         dp         saph       zuñiga         tibial                                                R          +           +             +           +          +                                               L           +           +             +           +          +         Motor exam:          Upper extremity              Bi(c5)  WE(c6)  EE(c7)   FF(c8)                                                R         5/5        5/5        5/5       5/5                                               L          5/5        5/5        5/5       5/5         Lower extremeity          HF(l2)   KE(l3)    TA(l4)   EHL(l5)  GS(s1)                                                 R        5/5        5/5        5/5       5/5         5/5                                               L         5/5        5/5       5/5       5/5          5/5       clonus in LE improved                                                   LABS:                        11.5   20.5  )-----------( 205      ( 19 Aug 2017 07:55 )             33.4     08-19    138  |  100  |  13  ----------------------------<  135<H>  4.9   |  22  |  0.70    Ca    8.8      19 Aug 2017 07:55      PT/INR - ( 18 Aug 2017 05:31 )   PT: 13.2 sec;   INR: 1.19          PTT - ( 17 Aug 2017 18:49 )  PTT:36.3 sec  Urinalysis Basic - ( 18 Aug 2017 06:58 )    Color: Yellow / Appearance: Clear / SG: <=1.005 / pH: x  Gluc: x / Ketone: NEGATIVE  / Bili: NEGATIVE / Urobili: 0.2 E.U./dL   Blood: x / Protein: NEGATIVE mg/dL / Nitrite: NEGATIVE   Leuk Esterase: Trace / RBC: x / WBC < 5 /HPF   Sq Epi: x / Non Sq Epi: Few /HPF / Bacteria: Present /HPF        A/P :  66y Female POD #1 following ACDF C3-4 and C4-5 for severe progressive deficit with cervical myelopathy  significant improvement subjective and objective motor and sensory exam postop  ambulating with PT with use of walker  unable to preop  plan d/c HV drain in AM  continue PT for gait training  OOB to chair  DVT prophylaxis  - Please follow up with Dr. Mccormack in 1-2 weeks; you may call the office to make an appointment at your earliest convenience.
